# Patient Record
Sex: FEMALE | NOT HISPANIC OR LATINO | Employment: FULL TIME | URBAN - METROPOLITAN AREA
[De-identification: names, ages, dates, MRNs, and addresses within clinical notes are randomized per-mention and may not be internally consistent; named-entity substitution may affect disease eponyms.]

---

## 2022-10-21 ENCOUNTER — OCCUPATIONAL MEDICINE (OUTPATIENT)
Dept: URGENT CARE | Facility: CLINIC | Age: 42
End: 2022-10-21
Payer: COMMERCIAL

## 2022-10-21 VITALS
BODY MASS INDEX: 30.27 KG/M2 | TEMPERATURE: 98.1 F | OXYGEN SATURATION: 98 % | DIASTOLIC BLOOD PRESSURE: 68 MMHG | RESPIRATION RATE: 18 BRPM | SYSTOLIC BLOOD PRESSURE: 106 MMHG | HEIGHT: 62 IN | HEART RATE: 70 BPM | WEIGHT: 164.5 LBS

## 2022-10-21 DIAGNOSIS — M54.50 ACUTE LEFT-SIDED LOW BACK PAIN WITHOUT SCIATICA: ICD-10-CM

## 2022-10-21 DIAGNOSIS — Z02.1 PRE-EMPLOYMENT DRUG SCREENING: ICD-10-CM

## 2022-10-21 LAB
AMP AMPHETAMINE: NORMAL
BREATH ALCOHOL COMMENT: NORMAL
COC COCAINE: NORMAL
INT CON NEG: NORMAL
INT CON POS: NORMAL
MET METHAMPHETAMINES: NORMAL
OPI OPIATES: NORMAL
PCP PHENCYCLIDINE: NORMAL
POC BREATHALIZER: 0 PERCENT (ref 0–0.01)
POC DRUG COMMENT 753798-OCCUPATIONAL HEALTH: NEGATIVE
THC: NORMAL

## 2022-10-21 PROCEDURE — 82075 ASSAY OF BREATH ETHANOL: CPT | Performed by: NURSE PRACTITIONER

## 2022-10-21 PROCEDURE — 80305 DRUG TEST PRSMV DIR OPT OBS: CPT | Performed by: NURSE PRACTITIONER

## 2022-10-21 PROCEDURE — 99203 OFFICE O/P NEW LOW 30 MIN: CPT | Performed by: NURSE PRACTITIONER

## 2022-10-21 NOTE — LETTER
"EMPLOYEE’S CLAIM FOR COMPENSATION/ REPORT OF INITIAL TREATMENT  FORM C-4    EMPLOYEE’S CLAIM - PROVIDE ALL INFORMATION REQUESTED   First Name  Mine Last Name  Rafy Birthdate                    1980                Sex  female Claim Number (Insurer’s Use Only)    Home Address  [address] Age  42 y.o. 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 (if applicable)  Cleaning the room    How did this injury or occupational disease occur? (Be specific an answer in detail. Use additional sheet if necessary)  I working my back is pain   If you believe that you have an occupational disease, when did you first have knowledge of the disability and it relationship to your employment?  n/a Witnesses to the Accident  no      Nature of Injury or Occupational Disease  Strain  Part(s) of Body Injured or Affected  Lower Back Area (Lumbar Area & Lumbo-Sacral), ,     I certify that the above is true and correct to the best of my knowledge and that I have provided this information in order to obtain the benefits of [place]’s " [place]).  I hereby authorize any physician, chiropractor, surgeon, practitioner, or other person, any hospital, including [facility] or [facility], any medical service organization, any insurance company, or other institution or organization to release to each other, any medical or other information, including benefits paid or payable, pertinent to this injury or disease, except information relative to diagnosis, treatment and/or counseling for AIDS, psychological conditions, alcohol or controlled substances, for which I must give specific authorization.  A Photostat of this authorization shall be as valid as the original.     Date   Place Employee’s Original or  *Electronic Signature   THIS REPORT MUST BE COMPLETED AND MAILED WITHIN 3 WORKING DAYS OF TREATMENT   Place  [facility]  Name of [facility]   Date  10/21/2022 Diagnosis and Description of Injury or Occupational Disease  (M54.50) Acute left-sided low back pain without sciatica Is there evidence the injured employee was under the influence of alcohol and/or another controlled substance at the time of accident?  ? No ? Yes (if yes, please explain)    Hour  3:58 PM   The encounter diagnosis was Acute left-sided low back pain without sciatica. No   Treatment  Rest, Ice and ibuprofen. No lifting pushing or pulling  Have you advised the patient to remain off work five days or     more?    X-Ray Findings      ? Yes Indicate dates:   From   To      From information given by the employee, together with medical evidence, can        you directly connect this injury or occupational disease as job incurred?  Yes ? No If no, is the injured employee capable of:  ? full duty  No ? modified duty  Yes   Is additional medical care by a physician indicated?  Yes If Modified Duty, Specify any Limitations / Restrictions  No lifting pushing  "pulling. No standing still for more than two hours at once. NO sitting for more than 2 hours at once.    Do you know of any previous injury or disease contributing to this condition or occupational disease?  ? Yes ? No (Explain if yes)                          No   Date  10/21/2022 Print Health Care Provider's   MARIALUISA Ceja I certify the employer’s copy of  this form was mailed on:   Address  [address] [place] [address] [address] Insurer’s Use Only     [facility] Zip  [address]    Provider’s Tax ID Number  [id number] Telephone  Dept: [phone]             Health Care Provider’s Original or Electronic Signature  e-SignNATALIE MILSL Degree (MD,DO, DC,PACiaraC,APRN)   APRN      * Complete and attach Release of Information (Form C-4A) when injured employee signs C-4 Form electronically  ORIGINAL - TREATING HEALTHCARE PROVIDER PAGE 2 - INSURER/TPA PAGE 3 - EMPLOYER PAGE 4 - EMPLOYEE             Form C-4 (rev.08/21)           BRIEF DESCRIPTION OF RIGHTS AND BENEFITS  (Pursuant to [place] 616C.050)    Notice of Injury or Occupational Disease (Incident Report Form C-1): If an injury or occupational disease (OD) arises out of and in the course of employment, you must provide written notice to your employer as soon as practicable, but no later than 7 days after the accident or OD. Your employer shall maintain a sufficient supply of the required forms.    Claim for Compensation (Form C-4): If medical treatment is sought, the form C-4 is available at the place of initial treatment. A completed \"Claim for Compensation\" (Form C-4) must be filed within 90 days after an accident or OD. The treating physician or chiropractor must, within 3 working days after treatment, complete and mail to the employer, the employer's insurer and third-party , the Claim for Compensation.    Medical Treatment: If you require medical treatment for your on-the-job injury or OD, you may be required to select a physician or " chiropractor from a list provided by your workers’ compensation insurer, if it has contracted with an Organization for Managed Care (MCO) or Preferred Provider Organization (PPO) or providers of health care. If your employer has not entered into a contract with an MCO or PPO, you may select a physician or chiropractor from the Panel of Physicians and Chiropractors. Any medical costs related to your industrial injury or OD will be paid by your insurer.    Temporary Total Disability (TTD): If your doctor has certified that you are unable to work for a period of at least 5 consecutive days, or 5 cumulative days in a 20-day period, or places restrictions on you that your employer does not accommodate, you may be entitled to TTD compensation.    Temporary Partial Disability (TPD): If the wage you receive upon reemployment is less than the compensation for TTD to which you are entitled, the insurer may be required to pay you TPD compensation to make up the difference. TPD can only be paid for a maximum of 24 months.    Permanent Partial Disability (PPD): When your medical condition is stable and there is an indication of a PPD as a result of your injury or OD, within 30 days, your insurer must arrange for an evaluation by a rating physician or chiropractor to determine the degree of your PPD. The amount of your PPD award depends on the date of injury, the results of the PPD evaluation, your age and wage.    Permanent Total Disability (PTD): If you are medically certified by a treating physician or chiropractor as permanently and totally disabled and have been granted a PTD status by your insurer, you are entitled to receive monthly benefits not to exceed 66 2/3% of your average monthly wage. The amount of your PTD payments is subject to reduction if you previously received a lump-sum PPD award.    Vocational Rehabilitation Services: You may be eligible for vocational rehabilitation services if you are unable to return to  the job due to a permanent physical impairment or permanent restrictions as a result of your injury or occupational disease.    Transportation and Per Lisa Reimbursement: You may be eligible for travel expenses and per lisa associated with medical treatment.    Reopening: You may be able to reopen your claim if your condition worsens after claim closure.     Appeal Process: If you disagree with a written determination issued by the insurer or the insurer does not respond to your request, you may appeal to the Department of Administration, , by following the instructions contained in your determination letter. You must appeal the determination within 70 days from the date of the determination letter at [address], [address], [place], [place] [address], or [address] [facility] [address], [place], [place] [address]. If you disagree with the  decision, you may appeal to the Department of Administration, . You must file your appeal within 30 days from the date of the  decision letter at [address], [address], [place], [place] [address], or [address] [facility] [facility] [address], [place] [address]. If you disagree with a decision of an , you may file a petition for judicial review with the District Court. You must do so within 30 days of the Appeal Officer’s decision. You may be represented by an  at your own expense or you may contact the [facility] for possible representation.    [place]  for Injured Workers (NAIW): If you disagree with a  decision, you may request that NAIW represent you without charge at an  Hearing. For information regarding denial of benefits, you may contact the [facility] at: [address], [address], [place] [address], [phone], or [address] [facility] [address], [place] [address], [phone]    To File a Complaint with the Division: If you wish  to file a complaint with the  of the Division of Industrial Relations (DIR),  please contact the Workers’ Compensation Section, [address], [address], [place], [place] [address], telephone (413) [phone], or [address], [address], [place], [place] [address], telephone [phone].    For assistance with Workers’ Compensation Issues: You may contact the [facility] Office for Consumer Health Assistance, [address], [address], [place], [place] [address], Toll Free 1-279.257.3724, Web site: http://[facility].nv.gov/Programs/SCOTT E-mail: scott@[facility].nv.gov              __________________________________________________________________                                    _________________            Employee Name / Signature                                                                                                                            Date                                                                                                                                                                                                                              D-2 (rev. 10/20)

## 2022-10-21 NOTE — LETTER
[address] [address]  Phone:  [phone] - Fax:  [phone]   [facility] Progress Report and Disability Certification  Date of Service: 10/21/2022   No Show:  No  Date / Time of Next Visit: 10/27/2022 @ 10:00am   Claim Information   Patient Name: Katlyn Hillman  Claim Number:     Employer: [employer] Date of Injury: 10/16/2022     Insurer / TPA: [employer]  ID / SSN:     Occupation: cleaning  Diagnosis: The encounter diagnosis was Acute left-sided low back pain without sciatica.    Medical Information   Related to Industrial Injury? Yes    Subjective Complaints:  DOI: 10/16/22 ADRIEN: Patient states that she was lifting a box while cleaning a room and felt a pull in her left lower back.  Patient states the pain is improving although she has not 100% at this point.  The pain does worsen with sitting or standing for too long.  Patient has not tried any alleviating measures at this point although she has been doing lighter duty work.  Patient denies second job or previous injury.    Patient denies nausea vomiting diarrhea.  Denies burning with urination flank pain or abdominal pain. Denies cough, shortness of breath or lower leg swelling. Patient denies direct trauma, bilateral or progressive neurological deficits as discussed, new urinary retention,  fecal incontinence or saddle anesthesia, no history of cancer , IV drug use, fevers, recent surgical procedures or the inability to walk. Patient denies ripping sensation in back or history of AAA, coldness or color change in extremity.    Objective Findings: Physical Exam  Constitutional:       Appearance: Normal appearance.   Musculoskeletal:      Cervical back: Normal.      Thoracic back: Normal.      Lumbar back: Spasms and tenderness present. No swelling, edema, deformity, signs of trauma or bony tenderness (No midline bony tenderness crepitus or step-off noted.). Normal  range of motion. Positive left straight leg raise test. Negative right straight leg raise test. No scoliosis.        Back:     Neurological:      Mental Status: She is alert.      Sensory: Sensation is intact.      Gait: Gait is intact.      Deep Tendon Reflexes:      Reflex Scores:       Bicep reflexes are 2+ on the right side and 2+ on the left side.     Pre-Existing Condition(s):     Assessment:   Initial Visit    Status: Additional Care Required  Permanent Disability:No    Plan:   Comments:Rest, ice ibuprofen    Diagnostics: X-ray  Comments:X-ray Lumbar Spine 4 View    Comments:  Patient was seen at [facility] on 10/16/2022 for acute low back pain related to injury.    Disability Information   Status: Released to Restricted Duty    From:  10/21/2022  Through: 10/27/2022 Restrictions are: Temporary   Physical Restrictions   Sitting:    Standing:    Stoopin hrs/day Bendin hrs/day   Squattin hrs/day Walking:    Climbin hrs/day Pushin hrs/day   Pullin hrs/day Other:    Reaching Above Shoulder (L):   Reaching Above Shoulder (R):       Reaching Below Shoulder (L):    Reaching Below Shoulder (R):      Not to exceed Weight Limits   Carrying(hrs):   Weight Limit(lb):   Lifting(hrs):   Weight  Limit(lb):     Comments: No lifting pushing pulling. No standing still for more than two hours at once. NO sitting for more than 2 hours at once.     Repetitive Actions   Hands: i.e. Fine Manipulations from Grasping:     Feet: i.e. Operating Foot Controls:     Driving / Operate Machinery:     Health Care Provider’s Original or Electronic Signature  CHARLOTTE CejaRSantyN. Health Care Provider’s Original or Electronic Signature    Kenneth Roger MD         Clinic Name / Location: [place] [address]  [place] [address] Clinic Phone Number: Dept: [phone]   Appointment Time: 2:30 Pm Visit Start Time: 3:58 PM   Check-In Time:  3:02 Pm Visit Discharge Time:      Original-Treating Physician or Chiropractor    Page 2-Insurer/TPA    Page 3-Employer    Page 4-Employee

## 2022-10-27 ENCOUNTER — OCCUPATIONAL MEDICINE (OUTPATIENT)
Dept: URGENT CARE | Facility: CLINIC | Age: 42
End: 2022-10-27
Payer: COMMERCIAL

## 2022-10-27 VITALS
RESPIRATION RATE: 16 BRPM | WEIGHT: 164.8 LBS | TEMPERATURE: 97.9 F | HEIGHT: 62 IN | SYSTOLIC BLOOD PRESSURE: 104 MMHG | DIASTOLIC BLOOD PRESSURE: 66 MMHG | OXYGEN SATURATION: 100 % | BODY MASS INDEX: 30.33 KG/M2 | HEART RATE: 86 BPM

## 2022-10-27 DIAGNOSIS — M54.50 ACUTE LEFT-SIDED LOW BACK PAIN WITHOUT SCIATICA: ICD-10-CM
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
## 2022-10-27 PROCEDURE — 99213 OFFICE O/P EST LOW 20 MIN: CPT | Performed by: STUDENT IN AN ORGANIZED HEALTH CARE EDUCATION/TRAINING PROGRAM

## 2022-10-27 NOTE — LETTER
[place] [address] [facility] [address] [address]  Phone:  [phone] - Fax:  [phone]   [facility] Progress Report and Disability Certification  Date of Service: 10/27/2022   No Show:  No  Date / Time of Next Visit: 11/1/2022   Claim Information   Patient Name: Katlyn Hillman  Claim Number:     Employer: [employer]  Date of Injury: 10/16/2022     Insurer / TPA: [employer]  ID / SSN:     Occupation: cleaning  Diagnosis: Diagnoses of Acute left-sided low back pain without sciatica and Work related injury were pertinent to this visit.    Medical Information   Related to Industrial Injury? Yes    Subjective Complaints:  DOI: 10/16/22 ADRIEN: Patient states that she was lifting a box while cleaning a room and felt a pull in her left lower back.  Patient states the pain is improving although she has not 100% at this point.  The pain does worsen with sitting or standing for too long.  Patient has not tried any alleviating measures at this point although she has been doing lighter duty work.  Patient denies second job or previous injury.     Patient denies nausea vomiting diarrhea.  Denies burning with urination flank pain or abdominal pain. Denies cough, shortness of breath or lower leg swelling. Patient denies direct trauma, bilateral or progressive neurological deficits as discussed, new urinary retention,  fecal incontinence or saddle anesthesia, no history of cancer , IV drug use, fevers, recent surgical procedures or the inability to walk. Patient denies ripping sensation in back or history of AAA, coldness or color change in extremity.     Follow-up visit 10/27/2022: Patient presents for reevaluation of work-related injury to her left lower back.  Patient was seen at her last visit and diagnosed with acute muscle strain of the left lower back without sciatica.  She does report that her pain has greatly improved and she is almost back to 100%.  She  states that she still has a little bit discomfort with certain movements such as picking things up from the ground but this does not always happen.  Not taking any medications for symptoms at this time.  She has been using heat with improvement in her symptoms.  She did return to work with her work restrictions and states no issues with her work activities.  She denies numbness, tingling, burning, radiation of pain into her lower extremities, saddle anesthesia, loss of bladder control, loss of bowel control, headache, dizziness, chest pain, shortness of breath, weakness, or reduced range of motion.  She denies second appointment or worsening of her symptoms.   Objective Findings: Lumbar back: Mild tenderness to palpation over the left lumbar paraspinal muscles.  No midline spinal tenderness, crepitus, or step-offs.  No erythema, ecchymosis, induration, fluctuance, swelling, or signs of trauma.  Full active and passive range of motion at the hips.  5 out of 5 strength during knee flexion and knee extension.  5 out of 5 strength during hip flexion and hip extension.  2+ patellar reflexes bilaterally.  Sensation intact.   Pre-Existing Condition(s):     Assessment:   Condition Improved    Status: Additional Care Required  Permanent Disability:No    Plan:      Diagnostics:      Comments:  Patient will do a 5-day trial of full work duty.  Patient is agreeable this.  Patient will follow-up in 5 days for reevaluation to see if any work duties for manageable or if additional work restrictions need to be put in place.    Disability Information   Status: Released to Full Duty    From:  10/27/2022  Through: 11/1/2022 Restrictions are: Temporary   Physical Restrictions   Sitting:    Standing:    Stooping:    Bending:      Squatting:    Walking:    Climbing:    Pushing:      Pulling:    Other:    Reaching Above Shoulder (L):   Reaching Above Shoulder (R):       Reaching Below Shoulder (L):    Reaching Below Shoulder (R):      Not  to exceed Weight Limits   Carrying(hrs):   Weight Limit(lb):   Lifting(hrs):   Weight  Limit(lb):     Comments: Patient's presentation physical exam findings are consistent with muscle strain to the left lower lumbar back without sciatica.  Her symptoms have greatly improved and she states that she previous feels like she is back to normal other than having a small amount of pain intermittently with certain and bending motions.  Discussed with the patient and we will do a 5-day trial of full work duty which she is agreeable to.  She will follow-up in clinic in 5 days to see if she is able to perform her normal work duties without issue or if additional work restrictions need to be put in place.  She may use ibuprofen/Tylenol as needed.  May use heat packs to the area 3-5 times per day for 20 minutes at a time.  ED/return precautions were given.    Repetitive Actions   Hands: i.e. Fine Manipulations from Grasping:     Feet: i.e. Operating Foot Controls:     Driving / Operate Machinery:     Health Care Provider’s Original or Electronic Signature  Mook James P.A.-C. Health Care Provider’s Original or Electronic Signature    Kenneth Roger MD         Clinic Name / Location: [address] [place] [address] Clinic Phone Number: Dept: [phone]   Appointment Time: 10:00 Am Visit Start Time: 10:55 AM   Check-In Time:  9:59 Am Visit Discharge Time:  11:40 AM    Original-Treating Physician or Chiropractor    Page 2-Insurer/TPA    Page 3-Employer    Page 4-Employee

## 2022-10-27 NOTE — PROGRESS NOTES
Subjective:     Katlyn Hillman is a 42 y.o. 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  Patient states the pain is improving although she has not 100% at this point.  The pain does worsen with sitting or standing for too long.  Patient has not tried any alleviating measures at this point although she has been doing lighter duty work.  Patient denies second job or previous injury.     Patient denies nausea vomiting diarrhea.  Denies burning with urination flank pain or abdominal pain. Denies cough, shortness of breath or lower leg swelling. Patient denies direct trauma, bilateral or progressive neurological deficits as discussed, new urinary retention,  fecal incontinence or saddle anesthesia, no history of cancer , IV drug use, fevers, recent surgical procedures or the inability to walk. Patient denies ripping sensation in back or history of AAA, coldness or color change in extremity.     Follow-up visit 10/27/2022: Patient presents for reevaluation of work-related injury to her left lower back.  Patient was seen at her last visit and diagnosed with acute muscle strain of the left lower back without sciatica.  She does report that her pain has greatly improved and she is almost back to 100%.  She states that she still has a little bit discomfort with certain movements such as picking things up from the ground but this does not always happen.  Not taking any medications for symptoms at this time.  She has been using heat with improvement in her symptoms.  She did return to work with her work restrictions and states no issues with her work activities.  She denies numbness, tingling, burning, radiation of pain into her lower extremities, saddle anesthesia, loss of bladder control, loss of bowel control, headache, dizziness, chest pain, shortness of breath, weakness, or reduced  "range of motion.  She denies second appointment or worsening of her symptoms.    PMH:   No pertinent past medical history to this problem  MEDS:  Medications were reviewed in EMR  ALLERGIES:  Allergies were reviewed in EMR  FH:   No pertinent family history to this problem       Objective:     /66   Pulse 86   Temp 36.6 °C (97.9 °F) (Temporal)   Resp 16   Ht 1.575 m (5' 2\")   Wt 74.8 kg (164 lb 12.8 oz)   SpO2 100%   BMI 30.14 kg/m²     Lumbar back: Mild tenderness to palpation over the left lumbar paraspinal muscles.  No midline spinal tenderness, crepitus, or step-offs.  No erythema, ecchymosis, induration, fluctuance, swelling, or signs of trauma.  Full active and passive range of motion at the hips.  5 out of 5 strength during knee flexion and knee extension.  5 out of 5 strength during hip flexion and hip extension.  2+ patellar reflexes bilaterally.  Sensation intact.    Assessment/Plan:       1. Acute left-sided low back pain without sciatica    2. Work related injury    Released to Full Duty FROM 10/27/2022 TO 11/1/2022  Patient's presentation physical exam findings are consistent with muscle strain to the left lower lumbar back without sciatica.  Her symptoms have greatly improved and she states that she previous feels like she is back to normal other than having a small amount of pain intermittently with certain and bending motions.  Discussed with the patient and we will do a 5-day trial of full work duty which she is agreeable to.  She will follow-up in clinic in 5 days to see if she is able to perform her normal work duties without issue or if additional work restrictions need to be put in place.  She may use ibuprofen/Tylenol as needed.  May use heat packs to the area 3-5 times per day for 20 minutes at a time.  ED/return precautions were given.  Patient will do a 5-day trial of full work duty.  Patient is agreeable this.  Patient will follow-up in 5 days for reevaluation to see if any " work duties for manageable or if additional work restrictions need to be put in place.    Differential diagnosis, natural history, supportive care, and indications for immediate follow-up discussed.

## 2025-02-28 ENCOUNTER — HOSPITAL ENCOUNTER (INPATIENT)
Facility: MEDICAL CENTER | Age: 45
LOS: 2 days | DRG: 760 | End: 2025-03-02
Attending: EMERGENCY MEDICINE | Admitting: INTERNAL MEDICINE
Payer: MEDICAID

## 2025-02-28 ENCOUNTER — APPOINTMENT (OUTPATIENT)
Dept: RADIOLOGY | Facility: MEDICAL CENTER | Age: 45
DRG: 760 | End: 2025-02-28
Attending: EMERGENCY MEDICINE
Payer: MEDICAID

## 2025-02-28 DIAGNOSIS — D64.9 ANEMIA, UNSPECIFIED TYPE: ICD-10-CM

## 2025-02-28 DIAGNOSIS — J10.1 INFLUENZA A: ICD-10-CM

## 2025-02-28 DIAGNOSIS — R52 BODY ACHES: Primary | ICD-10-CM

## 2025-02-28 DIAGNOSIS — R50.9 ACUTE FEBRILE ILLNESS: ICD-10-CM

## 2025-02-28 PROBLEM — E87.1 HYPONATREMIA: Status: ACTIVE | Noted: 2025-02-28

## 2025-02-28 PROBLEM — E87.6 HYPOKALEMIA: Status: ACTIVE | Noted: 2025-02-28

## 2025-02-28 PROBLEM — E87.20 NORMAL ANION GAP METABOLIC ACIDOSIS: Status: ACTIVE | Noted: 2025-02-28

## 2025-02-28 LAB
ABO GROUP BLD: NORMAL
ALBUMIN SERPL BCP-MCNC: 3.9 G/DL (ref 3.2–4.9)
ALBUMIN/GLOB SERPL: 1 G/DL
ALP SERPL-CCNC: 117 U/L (ref 30–99)
ALT SERPL-CCNC: 16 U/L (ref 2–50)
ANION GAP SERPL CALC-SCNC: 14 MMOL/L (ref 7–16)
ANISOCYTOSIS BLD QL SMEAR: ABNORMAL
APTT PPP: 38.7 SEC (ref 24.7–36)
AST SERPL-CCNC: 38 U/L (ref 12–45)
BARCODED ABORH UBTYP: 5100
BARCODED PRD CODE UBPRD: NORMAL
BARCODED UNIT NUM UBUNT: NORMAL
BASOPHILS # BLD AUTO: 0.3 % (ref 0–1.8)
BASOPHILS # BLD: 0.01 K/UL (ref 0–0.12)
BILIRUB SERPL-MCNC: 1.1 MG/DL (ref 0.1–1.5)
BLD GP AB SCN SERPL QL: NORMAL
BUN SERPL-MCNC: 11 MG/DL (ref 8–22)
CALCIUM ALBUM COR SERPL-MCNC: 8.5 MG/DL (ref 8.5–10.5)
CALCIUM SERPL-MCNC: 8.4 MG/DL (ref 8.5–10.5)
CHLORIDE SERPL-SCNC: 101 MMOL/L (ref 96–112)
CO2 SERPL-SCNC: 16 MMOL/L (ref 20–33)
COMMENT 1642: NORMAL
COMPONENT R 8504R: NORMAL
CREAT SERPL-MCNC: 1.05 MG/DL (ref 0.5–1.4)
EOSINOPHIL # BLD AUTO: 0 K/UL (ref 0–0.51)
EOSINOPHIL NFR BLD: 0 % (ref 0–6.9)
ERYTHROCYTE [DISTWIDTH] IN BLOOD BY AUTOMATED COUNT: 42.3 FL (ref 35.9–50)
FERRITIN SERPL-MCNC: 19.5 NG/ML (ref 10–291)
FLUAV RNA SPEC QL NAA+PROBE: POSITIVE
FLUBV RNA SPEC QL NAA+PROBE: NEGATIVE
FOLATE SERPL-MCNC: 8.2 NG/ML
GFR SERPLBLD CREATININE-BSD FMLA CKD-EPI: 68 ML/MIN/1.73 M 2
GLOBULIN SER CALC-MCNC: 4 G/DL (ref 1.9–3.5)
GLUCOSE SERPL-MCNC: 118 MG/DL (ref 65–99)
HCG SERPL QL: NEGATIVE
HCT VFR BLD AUTO: 26.1 % (ref 37–47)
HGB BLD-MCNC: 6.9 G/DL (ref 12–16)
HGB RETIC QN AUTO: 17.1 PG/CELL (ref 29–35)
HYPOCHROMIA BLD QL SMEAR: ABNORMAL
IMM GRANULOCYTES # BLD AUTO: 0.03 K/UL (ref 0–0.11)
IMM GRANULOCYTES NFR BLD AUTO: 0.8 % (ref 0–0.9)
IMM RETICS NFR: 23.7 % (ref 2.6–16.1)
INR PPP: 1.29 (ref 0.87–1.13)
IRON SATN MFR SERPL: 5 % (ref 15–55)
IRON SERPL-MCNC: 23 UG/DL (ref 40–170)
LACTATE SERPL-SCNC: 2.1 MMOL/L (ref 0.5–2)
LYMPHOCYTES # BLD AUTO: 0.32 K/UL (ref 1–4.8)
LYMPHOCYTES NFR BLD: 8.1 % (ref 22–41)
MCH RBC QN AUTO: 15.9 PG (ref 27–33)
MCHC RBC AUTO-ENTMCNC: 26.4 G/DL (ref 32.2–35.5)
MCV RBC AUTO: 60 FL (ref 81.4–97.8)
MICROCYTES BLD QL SMEAR: ABNORMAL
MONOCYTES # BLD AUTO: 0.41 K/UL (ref 0–0.85)
MONOCYTES NFR BLD AUTO: 10.4 % (ref 0–13.4)
MORPHOLOGY BLD-IMP: NORMAL
NEUTROPHILS # BLD AUTO: 3.19 K/UL (ref 1.82–7.42)
NEUTROPHILS NFR BLD: 80.4 % (ref 44–72)
NRBC # BLD AUTO: 0 K/UL
NRBC BLD-RTO: 0 /100 WBC (ref 0–0.2)
PLATELET # BLD AUTO: 168 K/UL (ref 164–446)
PLATELET BLD QL SMEAR: NORMAL
POIKILOCYTOSIS BLD QL SMEAR: NORMAL
POTASSIUM SERPL-SCNC: 2.5 MMOL/L (ref 3.6–5.5)
PRODUCT TYPE UPROD: NORMAL
PROT SERPL-MCNC: 7.9 G/DL (ref 6–8.2)
PROTHROMBIN TIME: 16.1 SEC (ref 12–14.6)
RBC # BLD AUTO: 4.35 M/UL (ref 4.2–5.4)
RBC BLD AUTO: PRESENT
RETICS # AUTO: 0.05 M/UL (ref 0.04–0.12)
RETICS/RBC NFR: 1.2 % (ref 0.8–2.6)
RH BLD: NORMAL
RSV RNA SPEC QL NAA+PROBE: NEGATIVE
SARS-COV-2 RNA RESP QL NAA+PROBE: NOTDETECTED
SODIUM SERPL-SCNC: 131 MMOL/L (ref 135–145)
TIBC SERPL-MCNC: 439 UG/DL (ref 250–450)
UIBC SERPL-MCNC: 416 UG/DL (ref 110–370)
UNIT STATUS USTAT: NORMAL
VIT B12 SERPL-MCNC: 385 PG/ML (ref 211–911)
WBC # BLD AUTO: 4 K/UL (ref 4.8–10.8)

## 2025-02-28 PROCEDURE — 82010 KETONE BODYS QUAN: CPT

## 2025-02-28 PROCEDURE — 85730 THROMBOPLASTIN TIME PARTIAL: CPT

## 2025-02-28 PROCEDURE — A9270 NON-COVERED ITEM OR SERVICE: HCPCS | Performed by: INTERNAL MEDICINE

## 2025-02-28 PROCEDURE — 85025 COMPLETE CBC W/AUTO DIFF WBC: CPT

## 2025-02-28 PROCEDURE — 700102 HCHG RX REV CODE 250 W/ 637 OVERRIDE(OP): Performed by: INTERNAL MEDICINE

## 2025-02-28 PROCEDURE — 83605 ASSAY OF LACTIC ACID: CPT

## 2025-02-28 PROCEDURE — A9270 NON-COVERED ITEM OR SERVICE: HCPCS | Mod: UD

## 2025-02-28 PROCEDURE — 770020 HCHG ROOM/CARE - TELE (206)

## 2025-02-28 PROCEDURE — 83550 IRON BINDING TEST: CPT

## 2025-02-28 PROCEDURE — 86900 BLOOD TYPING SEROLOGIC ABO: CPT

## 2025-02-28 PROCEDURE — 82746 ASSAY OF FOLIC ACID SERUM: CPT

## 2025-02-28 PROCEDURE — 36415 COLL VENOUS BLD VENIPUNCTURE: CPT

## 2025-02-28 PROCEDURE — A9270 NON-COVERED ITEM OR SERVICE: HCPCS | Mod: UD | Performed by: EMERGENCY MEDICINE

## 2025-02-28 PROCEDURE — 700101 HCHG RX REV CODE 250: Performed by: INTERNAL MEDICINE

## 2025-02-28 PROCEDURE — 86923 COMPATIBILITY TEST ELECTRIC: CPT

## 2025-02-28 PROCEDURE — 86850 RBC ANTIBODY SCREEN: CPT

## 2025-02-28 PROCEDURE — 85046 RETICYTE/HGB CONCENTRATE: CPT

## 2025-02-28 PROCEDURE — 99223 1ST HOSP IP/OBS HIGH 75: CPT | Performed by: INTERNAL MEDICINE

## 2025-02-28 PROCEDURE — 8E0ZXY6 ISOLATION: ICD-10-PCS | Performed by: INTERNAL MEDICINE

## 2025-02-28 PROCEDURE — 0241U HCHG SARS-COV-2 COVID-19 NFCT DS RESP RNA 4 TRGT ED POC: CPT

## 2025-02-28 PROCEDURE — 700105 HCHG RX REV CODE 258: Performed by: INTERNAL MEDICINE

## 2025-02-28 PROCEDURE — 700102 HCHG RX REV CODE 250 W/ 637 OVERRIDE(OP): Mod: UD

## 2025-02-28 PROCEDURE — 700102 HCHG RX REV CODE 250 W/ 637 OVERRIDE(OP): Mod: UD | Performed by: EMERGENCY MEDICINE

## 2025-02-28 PROCEDURE — 86901 BLOOD TYPING SEROLOGIC RH(D): CPT

## 2025-02-28 PROCEDURE — 85610 PROTHROMBIN TIME: CPT

## 2025-02-28 PROCEDURE — 82607 VITAMIN B-12: CPT

## 2025-02-28 PROCEDURE — 99285 EMERGENCY DEPT VISIT HI MDM: CPT

## 2025-02-28 PROCEDURE — 36430 TRANSFUSION BLD/BLD COMPNT: CPT

## 2025-02-28 PROCEDURE — P9016 RBC LEUKOCYTES REDUCED: HCPCS

## 2025-02-28 PROCEDURE — 82728 ASSAY OF FERRITIN: CPT

## 2025-02-28 PROCEDURE — 96375 TX/PRO/DX INJ NEW DRUG ADDON: CPT

## 2025-02-28 PROCEDURE — 83540 ASSAY OF IRON: CPT

## 2025-02-28 PROCEDURE — 700111 HCHG RX REV CODE 636 W/ 250 OVERRIDE (IP): Performed by: EMERGENCY MEDICINE

## 2025-02-28 PROCEDURE — 96365 THER/PROPH/DIAG IV INF INIT: CPT

## 2025-02-28 PROCEDURE — 80053 COMPREHEN METABOLIC PANEL: CPT

## 2025-02-28 PROCEDURE — 84703 CHORIONIC GONADOTROPIN ASSAY: CPT

## 2025-02-28 PROCEDURE — 700105 HCHG RX REV CODE 258: Mod: UD | Performed by: EMERGENCY MEDICINE

## 2025-02-28 RX ORDER — SODIUM CHLORIDE AND POTASSIUM CHLORIDE 300; 900 MG/100ML; MG/100ML
INJECTION, SOLUTION INTRAVENOUS CONTINUOUS
Status: DISCONTINUED | OUTPATIENT
Start: 2025-02-28 | End: 2025-03-01

## 2025-02-28 RX ORDER — OSELTAMIVIR PHOSPHATE 75 MG/1
75 CAPSULE ORAL 2 TIMES DAILY
Qty: 10 CAPSULE | Refills: 0 | Status: ACTIVE | OUTPATIENT
Start: 2025-02-28 | End: 2025-03-02

## 2025-02-28 RX ORDER — OSELTAMIVIR PHOSPHATE 75 MG/1
75 CAPSULE ORAL 2 TIMES DAILY
Status: DISCONTINUED | OUTPATIENT
Start: 2025-02-28 | End: 2025-03-02 | Stop reason: HOSPADM

## 2025-02-28 RX ORDER — ACETAMINOPHEN 500 MG
1000 TABLET ORAL ONCE
Status: DISCONTINUED | OUTPATIENT
Start: 2025-02-28 | End: 2025-02-28

## 2025-02-28 RX ORDER — IBUPROFEN 600 MG/1
600 TABLET, FILM COATED ORAL EVERY 6 HOURS PRN
Qty: 21 TABLET | Refills: 0 | Status: SHIPPED | OUTPATIENT
Start: 2025-02-28 | End: 2025-03-07

## 2025-02-28 RX ORDER — OXYCODONE HYDROCHLORIDE 5 MG/1
5 TABLET ORAL
Refills: 0 | Status: DISCONTINUED | OUTPATIENT
Start: 2025-02-28 | End: 2025-03-02 | Stop reason: HOSPADM

## 2025-02-28 RX ORDER — POTASSIUM CHLORIDE 1500 MG/1
40 TABLET, EXTENDED RELEASE ORAL
Status: COMPLETED | OUTPATIENT
Start: 2025-02-28 | End: 2025-03-01

## 2025-02-28 RX ORDER — MORPHINE SULFATE 4 MG/ML
2 INJECTION INTRAVENOUS
Status: DISCONTINUED | OUTPATIENT
Start: 2025-02-28 | End: 2025-03-02 | Stop reason: HOSPADM

## 2025-02-28 RX ORDER — ACETAMINOPHEN 325 MG/1
650 TABLET ORAL ONCE
Status: COMPLETED | OUTPATIENT
Start: 2025-02-28 | End: 2025-02-28

## 2025-02-28 RX ORDER — SODIUM CHLORIDE 9 MG/ML
1000 INJECTION, SOLUTION INTRAVENOUS ONCE
Status: COMPLETED | OUTPATIENT
Start: 2025-02-28 | End: 2025-02-28

## 2025-02-28 RX ORDER — AMOXICILLIN 250 MG
2 CAPSULE ORAL EVERY EVENING
Status: DISCONTINUED | OUTPATIENT
Start: 2025-02-28 | End: 2025-03-02 | Stop reason: HOSPADM

## 2025-02-28 RX ORDER — PROMETHAZINE HYDROCHLORIDE 25 MG/1
12.5-25 TABLET ORAL EVERY 4 HOURS PRN
Status: DISCONTINUED | OUTPATIENT
Start: 2025-02-28 | End: 2025-03-02 | Stop reason: HOSPADM

## 2025-02-28 RX ORDER — ONDANSETRON 2 MG/ML
4 INJECTION INTRAMUSCULAR; INTRAVENOUS EVERY 4 HOURS PRN
Status: DISCONTINUED | OUTPATIENT
Start: 2025-02-28 | End: 2025-03-02 | Stop reason: HOSPADM

## 2025-02-28 RX ORDER — KETOROLAC TROMETHAMINE 15 MG/ML
15 INJECTION, SOLUTION INTRAMUSCULAR; INTRAVENOUS ONCE
Status: COMPLETED | OUTPATIENT
Start: 2025-02-28 | End: 2025-02-28

## 2025-02-28 RX ORDER — ACETAMINOPHEN 325 MG/1
650 TABLET ORAL EVERY 6 HOURS PRN
Status: DISCONTINUED | OUTPATIENT
Start: 2025-02-28 | End: 2025-03-02 | Stop reason: HOSPADM

## 2025-02-28 RX ORDER — POLYETHYLENE GLYCOL 3350 17 G/17G
1 POWDER, FOR SOLUTION ORAL
Status: DISCONTINUED | OUTPATIENT
Start: 2025-02-28 | End: 2025-03-02 | Stop reason: HOSPADM

## 2025-02-28 RX ORDER — OXYCODONE HYDROCHLORIDE 5 MG/1
2.5 TABLET ORAL
Refills: 0 | Status: DISCONTINUED | OUTPATIENT
Start: 2025-02-28 | End: 2025-03-02 | Stop reason: HOSPADM

## 2025-02-28 RX ORDER — SODIUM CHLORIDE, SODIUM LACTATE, POTASSIUM CHLORIDE, AND CALCIUM CHLORIDE .6; .31; .03; .02 G/100ML; G/100ML; G/100ML; G/100ML
1000 INJECTION, SOLUTION INTRAVENOUS ONCE
Status: COMPLETED | OUTPATIENT
Start: 2025-02-28 | End: 2025-02-28

## 2025-02-28 RX ORDER — PROMETHAZINE HYDROCHLORIDE 25 MG/1
12.5-25 SUPPOSITORY RECTAL EVERY 4 HOURS PRN
Status: DISCONTINUED | OUTPATIENT
Start: 2025-02-28 | End: 2025-03-02 | Stop reason: HOSPADM

## 2025-02-28 RX ORDER — ONDANSETRON 4 MG/1
4 TABLET, ORALLY DISINTEGRATING ORAL EVERY 4 HOURS PRN
Status: DISCONTINUED | OUTPATIENT
Start: 2025-02-28 | End: 2025-03-02 | Stop reason: HOSPADM

## 2025-02-28 RX ORDER — OSELTAMIVIR PHOSPHATE 75 MG/1
75 CAPSULE ORAL ONCE
Status: COMPLETED | OUTPATIENT
Start: 2025-02-28 | End: 2025-02-28

## 2025-02-28 RX ORDER — PROCHLORPERAZINE EDISYLATE 5 MG/ML
5-10 INJECTION INTRAMUSCULAR; INTRAVENOUS EVERY 4 HOURS PRN
Status: DISCONTINUED | OUTPATIENT
Start: 2025-02-28 | End: 2025-03-02 | Stop reason: HOSPADM

## 2025-02-28 RX ADMIN — OSELTAMIVIR PHOSPHATE 75 MG: 75 CAPSULE ORAL at 17:25

## 2025-02-28 RX ADMIN — ACETAMINOPHEN 650 MG: 325 TABLET ORAL at 19:24

## 2025-02-28 RX ADMIN — SODIUM CHLORIDE 1000 ML: 9 INJECTION, SOLUTION INTRAVENOUS at 20:24

## 2025-02-28 RX ADMIN — SODIUM CHLORIDE, POTASSIUM CHLORIDE, SODIUM LACTATE AND CALCIUM CHLORIDE 1000 ML: 600; 310; 30; 20 INJECTION, SOLUTION INTRAVENOUS at 17:27

## 2025-02-28 RX ADMIN — POTASSIUM CHLORIDE 40 MEQ: 1500 TABLET, EXTENDED RELEASE ORAL at 23:02

## 2025-02-28 RX ADMIN — POTASSIUM CHLORIDE 40 MEQ: 1500 TABLET, EXTENDED RELEASE ORAL at 19:09

## 2025-02-28 RX ADMIN — ACETAMINOPHEN 650 MG: 325 TABLET ORAL at 15:35

## 2025-02-28 RX ADMIN — KETOROLAC TROMETHAMINE 15 MG: 15 INJECTION, SOLUTION INTRAMUSCULAR; INTRAVENOUS at 18:33

## 2025-02-28 RX ADMIN — OSELTAMIVIR PHOSPHATE 75 MG: 75 CAPSULE ORAL at 19:24

## 2025-02-28 RX ADMIN — POTASSIUM CHLORIDE AND SODIUM CHLORIDE: 900; 300 INJECTION, SOLUTION INTRAVENOUS at 19:18

## 2025-02-28 ASSESSMENT — COGNITIVE AND FUNCTIONAL STATUS - GENERAL
DAILY ACTIVITIY SCORE: 24
SUGGESTED CMS G CODE MODIFIER MOBILITY: CH
SUGGESTED CMS G CODE MODIFIER DAILY ACTIVITY: CH
MOBILITY SCORE: 24

## 2025-02-28 ASSESSMENT — ENCOUNTER SYMPTOMS
FOCAL WEAKNESS: 0
NAUSEA: 0
ORTHOPNEA: 0
HEADACHES: 0
FLANK PAIN: 0
NERVOUS/ANXIOUS: 0
PHOTOPHOBIA: 0
MYALGIAS: 1
BACK PAIN: 0
COUGH: 0
WEIGHT LOSS: 0
PALPITATIONS: 0
SPEECH CHANGE: 0
CHILLS: 1
POLYDIPSIA: 0
TREMORS: 0
DOUBLE VISION: 0
BLURRED VISION: 0
HEMOPTYSIS: 0
SPUTUM PRODUCTION: 0
BRUISES/BLEEDS EASILY: 0
NECK PAIN: 0
VOMITING: 0
HALLUCINATIONS: 0
HEARTBURN: 0
FEVER: 1

## 2025-02-28 ASSESSMENT — LIFESTYLE VARIABLES: SUBSTANCE_ABUSE: 0

## 2025-02-28 ASSESSMENT — FIBROSIS 4 INDEX: FIB4 SCORE: 2.43

## 2025-02-28 ASSESSMENT — PAIN DESCRIPTION - PAIN TYPE: TYPE: ACUTE PAIN

## 2025-02-28 NOTE — ED TRIAGE NOTES
Pt to triage .  Chief Complaint   Patient presents with    Body Aches     Pt c/o body aches, cough, fever x 2 days     Cough    Fever

## 2025-03-01 ENCOUNTER — APPOINTMENT (OUTPATIENT)
Dept: RADIOLOGY | Facility: MEDICAL CENTER | Age: 45
DRG: 760 | End: 2025-03-01
Attending: INTERNAL MEDICINE
Payer: MEDICAID

## 2025-03-01 ENCOUNTER — APPOINTMENT (OUTPATIENT)
Dept: RADIOLOGY | Facility: MEDICAL CENTER | Age: 45
DRG: 760 | End: 2025-03-01
Attending: EMERGENCY MEDICINE
Payer: MEDICAID

## 2025-03-01 LAB
ANION GAP SERPL CALC-SCNC: 9 MMOL/L (ref 7–16)
APPEARANCE UR: ABNORMAL
B-OH-BUTYR SERPL-MCNC: 0.42 MMOL/L (ref 0.02–0.27)
BACTERIA #/AREA URNS HPF: ABNORMAL /HPF
BASOPHILS # BLD AUTO: 0.3 % (ref 0–1.8)
BASOPHILS # BLD: 0.01 K/UL (ref 0–0.12)
BILIRUB UR QL STRIP.AUTO: NEGATIVE
BUN SERPL-MCNC: 12 MG/DL (ref 8–22)
CALCIUM SERPL-MCNC: 7.7 MG/DL (ref 8.5–10.5)
CASTS URNS QL MICRO: ABNORMAL /LPF (ref 0–2)
CHLORIDE SERPL-SCNC: 110 MMOL/L (ref 96–112)
CO2 SERPL-SCNC: 18 MMOL/L (ref 20–33)
COLOR UR: ABNORMAL
CREAT SERPL-MCNC: 0.88 MG/DL (ref 0.5–1.4)
EOSINOPHIL # BLD AUTO: 0.02 K/UL (ref 0–0.51)
EOSINOPHIL NFR BLD: 0.7 % (ref 0–6.9)
EPITHELIAL CELLS 1715: ABNORMAL /HPF (ref 0–5)
ERYTHROCYTE [DISTWIDTH] IN BLOOD BY AUTOMATED COUNT: 55.8 FL (ref 35.9–50)
GFR SERPLBLD CREATININE-BSD FMLA CKD-EPI: 84 ML/MIN/1.73 M 2
GLUCOSE SERPL-MCNC: 103 MG/DL (ref 65–99)
GLUCOSE UR STRIP.AUTO-MCNC: NEGATIVE MG/DL
HCT VFR BLD AUTO: 28.8 % (ref 37–47)
HGB BLD-MCNC: 7.9 G/DL (ref 12–16)
HGB BLD-MCNC: 8.1 G/DL (ref 12–16)
IMM GRANULOCYTES # BLD AUTO: 0.02 K/UL (ref 0–0.11)
IMM GRANULOCYTES NFR BLD AUTO: 0.7 % (ref 0–0.9)
INR PPP: 1.23 (ref 0.87–1.13)
KETONES UR STRIP.AUTO-MCNC: NEGATIVE MG/DL
LACTATE SERPL-SCNC: 1.2 MMOL/L (ref 0.5–2)
LACTATE SERPL-SCNC: 1.2 MMOL/L (ref 0.5–2)
LEUKOCYTE ESTERASE UR QL STRIP.AUTO: ABNORMAL
LYMPHOCYTES # BLD AUTO: 0.92 K/UL (ref 1–4.8)
LYMPHOCYTES NFR BLD: 31.6 % (ref 22–41)
MAGNESIUM SERPL-MCNC: 2 MG/DL (ref 1.5–2.5)
MCH RBC QN AUTO: 18 PG (ref 27–33)
MCHC RBC AUTO-ENTMCNC: 27.4 G/DL (ref 32.2–35.5)
MCV RBC AUTO: 65.8 FL (ref 81.4–97.8)
MICRO URNS: ABNORMAL
MONOCYTES # BLD AUTO: 0.35 K/UL (ref 0–0.85)
MONOCYTES NFR BLD AUTO: 12 % (ref 0–13.4)
NEUTROPHILS # BLD AUTO: 1.59 K/UL (ref 1.82–7.42)
NEUTROPHILS NFR BLD: 54.7 % (ref 44–72)
NITRITE UR QL STRIP.AUTO: NEGATIVE
NRBC # BLD AUTO: 0 K/UL
NRBC BLD-RTO: 0 /100 WBC (ref 0–0.2)
PH UR STRIP.AUTO: 6.5 [PH] (ref 5–8)
PLATELET # BLD AUTO: 161 K/UL (ref 164–446)
PMV BLD AUTO: 10 FL (ref 9–12.9)
POTASSIUM SERPL-SCNC: 3.3 MMOL/L (ref 3.6–5.5)
PROCALCITONIN SERPL-MCNC: 1.47 NG/ML
PROT UR QL STRIP: NEGATIVE MG/DL
PROTHROMBIN TIME: 15.6 SEC (ref 12–14.6)
RBC # BLD AUTO: 4.38 M/UL (ref 4.2–5.4)
RBC # URNS HPF: ABNORMAL /HPF (ref 0–2)
RBC UR QL AUTO: ABNORMAL
SODIUM SERPL-SCNC: 137 MMOL/L (ref 135–145)
SP GR UR STRIP.AUTO: 1.01
UROBILINOGEN UR STRIP.AUTO-MCNC: 0.2 EU/DL
WBC # BLD AUTO: 2.9 K/UL (ref 4.8–10.8)
WBC #/AREA URNS HPF: ABNORMAL /HPF

## 2025-03-01 PROCEDURE — 76830 TRANSVAGINAL US NON-OB: CPT

## 2025-03-01 PROCEDURE — 71045 X-RAY EXAM CHEST 1 VIEW: CPT

## 2025-03-01 PROCEDURE — A9270 NON-COVERED ITEM OR SERVICE: HCPCS | Performed by: INTERNAL MEDICINE

## 2025-03-01 PROCEDURE — 85018 HEMOGLOBIN: CPT

## 2025-03-01 PROCEDURE — 83605 ASSAY OF LACTIC ACID: CPT

## 2025-03-01 PROCEDURE — 700111 HCHG RX REV CODE 636 W/ 250 OVERRIDE (IP): Mod: JZ | Performed by: INTERNAL MEDICINE

## 2025-03-01 PROCEDURE — 87040 BLOOD CULTURE FOR BACTERIA: CPT | Mod: 91

## 2025-03-01 PROCEDURE — 700105 HCHG RX REV CODE 258: Performed by: INTERNAL MEDICINE

## 2025-03-01 PROCEDURE — 83735 ASSAY OF MAGNESIUM: CPT

## 2025-03-01 PROCEDURE — 700102 HCHG RX REV CODE 250 W/ 637 OVERRIDE(OP): Performed by: INTERNAL MEDICINE

## 2025-03-01 PROCEDURE — 99233 SBSQ HOSP IP/OBS HIGH 50: CPT | Performed by: INTERNAL MEDICINE

## 2025-03-01 PROCEDURE — 770020 HCHG ROOM/CARE - TELE (206)

## 2025-03-01 PROCEDURE — 700101 HCHG RX REV CODE 250: Performed by: INTERNAL MEDICINE

## 2025-03-01 PROCEDURE — 81001 URINALYSIS AUTO W/SCOPE: CPT

## 2025-03-01 PROCEDURE — 85025 COMPLETE CBC W/AUTO DIFF WBC: CPT

## 2025-03-01 PROCEDURE — 84145 PROCALCITONIN (PCT): CPT

## 2025-03-01 PROCEDURE — 30233N1 TRANSFUSION OF NONAUTOLOGOUS RED BLOOD CELLS INTO PERIPHERAL VEIN, PERCUTANEOUS APPROACH: ICD-10-PCS | Performed by: EMERGENCY MEDICINE

## 2025-03-01 PROCEDURE — 85610 PROTHROMBIN TIME: CPT

## 2025-03-01 PROCEDURE — 80048 BASIC METABOLIC PNL TOTAL CA: CPT

## 2025-03-01 PROCEDURE — 700111 HCHG RX REV CODE 636 W/ 250 OVERRIDE (IP)

## 2025-03-01 PROCEDURE — 36415 COLL VENOUS BLD VENIPUNCTURE: CPT

## 2025-03-01 RX ORDER — POTASSIUM CHLORIDE 1500 MG/1
40 TABLET, EXTENDED RELEASE ORAL ONCE
Status: COMPLETED | OUTPATIENT
Start: 2025-03-01 | End: 2025-03-01

## 2025-03-01 RX ORDER — SODIUM CHLORIDE 9 MG/ML
INJECTION, SOLUTION INTRAVENOUS CONTINUOUS
Status: DISCONTINUED | OUTPATIENT
Start: 2025-03-01 | End: 2025-03-02

## 2025-03-01 RX ORDER — MAGNESIUM SULFATE HEPTAHYDRATE 40 MG/ML
2 INJECTION, SOLUTION INTRAVENOUS ONCE
Status: COMPLETED | OUTPATIENT
Start: 2025-03-01 | End: 2025-03-01

## 2025-03-01 RX ADMIN — OSELTAMIVIR PHOSPHATE 75 MG: 75 CAPSULE ORAL at 04:34

## 2025-03-01 RX ADMIN — MAGNESIUM SULFATE HEPTAHYDRATE 2 G: 2 INJECTION, SOLUTION INTRAVENOUS at 02:28

## 2025-03-01 RX ADMIN — ACETAMINOPHEN 650 MG: 325 TABLET ORAL at 11:51

## 2025-03-01 RX ADMIN — POTASSIUM CHLORIDE 40 MEQ: 1500 TABLET, EXTENDED RELEASE ORAL at 09:04

## 2025-03-01 RX ADMIN — POTASSIUM CHLORIDE 40 MEQ: 1500 TABLET, EXTENDED RELEASE ORAL at 00:31

## 2025-03-01 RX ADMIN — POTASSIUM CHLORIDE AND SODIUM CHLORIDE: 900; 300 INJECTION, SOLUTION INTRAVENOUS at 04:31

## 2025-03-01 RX ADMIN — ACETAMINOPHEN 650 MG: 325 TABLET ORAL at 20:41

## 2025-03-01 RX ADMIN — OSELTAMIVIR PHOSPHATE 75 MG: 75 CAPSULE ORAL at 17:35

## 2025-03-01 RX ADMIN — SODIUM CHLORIDE 250 MG: 9 INJECTION, SOLUTION INTRAVENOUS at 17:43

## 2025-03-01 RX ADMIN — SODIUM CHLORIDE: 9 INJECTION, SOLUTION INTRAVENOUS at 10:09

## 2025-03-01 SDOH — ECONOMIC STABILITY: TRANSPORTATION INSECURITY
IN THE PAST 12 MONTHS, HAS LACK OF RELIABLE TRANSPORTATION KEPT YOU FROM MEDICAL APPOINTMENTS, MEETINGS, WORK OR FROM GETTING THINGS NEEDED FOR DAILY LIVING?: NO

## 2025-03-01 SDOH — ECONOMIC STABILITY: TRANSPORTATION INSECURITY
IN THE PAST 12 MONTHS, HAS THE LACK OF TRANSPORTATION KEPT YOU FROM MEDICAL APPOINTMENTS OR FROM GETTING MEDICATIONS?: NO

## 2025-03-01 ASSESSMENT — LIFESTYLE VARIABLES
TOTAL SCORE: 0
EVER FELT BAD OR GUILTY ABOUT YOUR DRINKING: NO
DOES PATIENT WANT TO STOP DRINKING: NO
TOTAL SCORE: 0
HAVE YOU EVER FELT YOU SHOULD CUT DOWN ON YOUR DRINKING: NO
AVERAGE NUMBER OF DAYS PER WEEK YOU HAVE A DRINK CONTAINING ALCOHOL: 0
HAVE PEOPLE ANNOYED YOU BY CRITICIZING YOUR DRINKING: NO
ALCOHOL_USE: NO
CONSUMPTION TOTAL: NEGATIVE
TOTAL SCORE: 0
ON A TYPICAL DAY WHEN YOU DRINK ALCOHOL HOW MANY DRINKS DO YOU HAVE: 0
EVER HAD A DRINK FIRST THING IN THE MORNING TO STEADY YOUR NERVES TO GET RID OF A HANGOVER: NO
HOW MANY TIMES IN THE PAST YEAR HAVE YOU HAD 5 OR MORE DRINKS IN A DAY: 0

## 2025-03-01 ASSESSMENT — ENCOUNTER SYMPTOMS
VOMITING: 0
CHILLS: 0
FEVER: 0
BLURRED VISION: 0
WEAKNESS: 0
CONSTIPATION: 0
NAUSEA: 0
PALPITATIONS: 0
SPEECH CHANGE: 0
MYALGIAS: 0
DIZZINESS: 0
COUGH: 0
CLAUDICATION: 0
ABDOMINAL PAIN: 0
DOUBLE VISION: 0
DIARRHEA: 0
NECK PAIN: 0
WEIGHT LOSS: 0
HEMOPTYSIS: 0
PHOTOPHOBIA: 0
ORTHOPNEA: 0

## 2025-03-01 ASSESSMENT — SOCIAL DETERMINANTS OF HEALTH (SDOH)
WITHIN THE PAST 12 MONTHS, YOU WORRIED THAT YOUR FOOD WOULD RUN OUT BEFORE YOU GOT THE MONEY TO BUY MORE: NEVER TRUE
WITHIN THE LAST YEAR, HAVE YOU BEEN AFRAID OF YOUR PARTNER OR EX-PARTNER?: NO
WITHIN THE PAST 12 MONTHS, THE FOOD YOU BOUGHT JUST DIDN'T LAST AND YOU DIDN'T HAVE MONEY TO GET MORE: NEVER TRUE
WITHIN THE LAST YEAR, HAVE TO BEEN RAPED OR FORCED TO HAVE ANY KIND OF SEXUAL ACTIVITY BY YOUR PARTNER OR EX-PARTNER?: NO
WITHIN THE LAST YEAR, HAVE YOU BEEN HUMILIATED OR EMOTIONALLY ABUSED IN OTHER WAYS BY YOUR PARTNER OR EX-PARTNER?: NO
WITHIN THE LAST YEAR, HAVE YOU BEEN KICKED, HIT, SLAPPED, OR OTHERWISE PHYSICALLY HURT BY YOUR PARTNER OR EX-PARTNER?: NO
IN THE PAST 12 MONTHS, HAS THE ELECTRIC, GAS, OIL, OR WATER COMPANY THREATENED TO SHUT OFF SERVICE IN YOUR HOME?: NO

## 2025-03-01 ASSESSMENT — PAIN DESCRIPTION - PAIN TYPE
TYPE: ACUTE PAIN

## 2025-03-01 ASSESSMENT — PATIENT HEALTH QUESTIONNAIRE - PHQ9
1. LITTLE INTEREST OR PLEASURE IN DOING THINGS: NOT AT ALL
SUM OF ALL RESPONSES TO PHQ9 QUESTIONS 1 AND 2: 0
2. FEELING DOWN, DEPRESSED, IRRITABLE, OR HOPELESS: NOT AT ALL

## 2025-03-01 ASSESSMENT — FIBROSIS 4 INDEX: FIB4 SCORE: 2.54

## 2025-03-01 NOTE — CARE PLAN
The patient is Watcher - Medium risk of patient condition declining or worsening    Shift Goals  Clinical Goals: VSS, safety  Patient Goals: rest  Family Goals: major    Progress made toward(s) clinical / shift goals:    Problem: Hemodynamics  Goal: Patient's hemodynamics, fluid balance and neurologic status will be stable or improve  Outcome: Progressing  Note: Patient's BP responded to IV fluids and RBC.      Problem: Fluid Volume  Goal: Fluid volume balance will be maintained  Outcome: Progressing  Note: Patient received IV fluid boluses, remains on room air, SBP now above 90.        Patient is not progressing towards the following goals:

## 2025-03-01 NOTE — PROGRESS NOTES
Bedside report received from off going RN/tech: KISHAN Regalado, assumed care of patient.     Fall Risk Score: MODERATE RISK  Fall risk interventions in place: Place yellow fall risk ID band on patient, Provide patient/family education based on risk assessment, Educate patient/family to call staff for assistance when getting out of bed, Place fall precaution signage outside patient door, Utilize bed/chair fall alarm, and Bed alarm connected correctly  Bed type: Regular (John Score less than 17 interventions in place)  Patient on cardiac monitor: Yes  IVF/IV medications: Infusion per MAR (List Med(s)) NaCl w/ Kcl 100ml/hr  Oxygen: Room Air  Bedside sitter: Not Applicable   Isolation: Isolation precautions in place

## 2025-03-01 NOTE — ASSESSMENT & PLAN NOTE
Due to vaginal bleeding, heavy periods  Denied any abdominal pain, ultrasound did not show any acute finding  IV iron  Follow-up with OB as outpatient

## 2025-03-01 NOTE — ED PROVIDER NOTES
ER Provider Note    Scribed for Carlos Eduardo Sam M.d. by Liliane Oliveira. 2/28/2025  4:54 PM    Primary Care Provider: None pertinent     CHIEF COMPLAINT   Chief Complaint   Patient presents with    Body Aches     Pt c/o body aches, cough, fever x 2 days     Cough    Fever     HPI/ROS  LIMITATION TO HISTORY   Select: Language Bahamian,  Used   OUTSIDE HISTORIAN(S):   at bedside    Katlyn Hillman is a 45 y.o. female who presents to the ED for evaluation of flu-like symptoms onset yesterday. Patient reports body aches, head pain, chills, cough, and fever, but denies any vomiting, diarrhea, or sick contacts at home. Patient describes pain to be all over her body. She treated her symptoms with tylenol yesterday. She denies vaginal bleeding, vaginal discharge, hematochezia, or hematuria. Denies a past major medical history or use of daily medications. She started her menstrual cycle yesterday, and describes it as light today. Denies a chance of pregnancy. Patient is not established with OB-GYN.    PAST MEDICAL HISTORY  Past Medical History:   Diagnosis Date    Anemia 2/28/2025       SURGICAL HISTORY  Past Surgical History:   Procedure Laterality Date    REPEAT C SECTION W TUBAL LIGATION  4/3/2014    Performed by Soledad Chicas M.D. at [facility]    PRIMARY C SECTION         FAMILY HISTORY  None noted     SOCIAL HISTORY   reports that she has never smoked. She has never used smokeless tobacco. She reports that she does not drink alcohol and does not use drugs.    CURRENT MEDICATIONS  Current Discharge Medication List        CONTINUE these medications which have NOT CHANGED    Details   ACETAMINOPHEN PO Take 2 Tablets by mouth every 6 hours as needed for Mild Pain.             ALLERGIES  Patient has no known allergies.    PHYSICAL EXAM  /55   Pulse 97   Temp (!) 39.2 °C (102.5 °F) (Oral)   Resp 16   Wt 74.4 kg (164 lb)   LMP 02/28/2025   SpO2 98%   BMI 30.00 kg/m²   Constitutional:  Alert in no apparent distress.  HENT: Normocephalic, Atraumatic, Bilateral external ears normal. Nose normal.   Eyes: Pupils are equal and reactive. Conjunctiva normal, non-icteric.   Heart: Regular rate and rhythm, no murmurs.    Lungs: Clear to auscultation bilaterally.  Skin: Warm, Dry, No erythema, No rash.   Neurologic: Alert, Grossly non-focal.   Psychiatric: Affect normal, Judgment normal, Mood normal, Appears appropriate and not intoxicated.        DIAGNOSTIC STUDIES    EKG/LABS  Labs Reviewed   CBC WITH DIFFERENTIAL - Abnormal; Notable for the following components:       Result Value    WBC 4.0 (*)     Hemoglobin 6.9 (*)     Hematocrit 26.1 (*)     MCV 60.0 (*)     MCH 15.9 (*)     MCHC 26.4 (*)     Neutrophils-Polys 80.40 (*)     Lymphocytes 8.10 (*)     Lymphs (Absolute) 0.32 (*)     Hypochromia 2+ (*)     Microcytosis 3+ (*)     All other components within normal limits   COMP METABOLIC PANEL - Abnormal; Notable for the following components:    Sodium 131 (*)     Potassium 2.5 (*)     Co2 16 (*)     Glucose 118 (*)     Calcium 8.4 (*)     Alkaline Phosphatase 117 (*)     Globulin 4.0 (*)     All other components within normal limits   PROTHROMBIN TIME - Abnormal; Notable for the following components:    PT 16.1 (*)     INR 1.29 (*)     All other components within normal limits   APTT - Abnormal; Notable for the following components:    APTT 38.7 (*)     All other components within normal limits   RETICULOCYTES COUNT - Abnormal; Notable for the following components:    Imm. Reticulocyte Fraction 23.7 (*)     Retic Hgb Equivalent 17.1 (*)     All other components within normal limits   IRON/TOTAL IRON BIND - Abnormal; Notable for the following components:    Iron 23 (*)     Unsat Iron Binding 416 (*)     % Saturation 5 (*)     All other components within normal limits   LACTIC ACID - Abnormal; Notable for the following components:    Lactic Acid 2.1 (*)     All other components within normal limits   POC  COV-2, FLU A/B, RSV BY PCR - Abnormal; Notable for the following components:    POC Influenza A RNA, PCR POSITIVE (*)     All other components within normal limits   HCG QUAL SERUM   PLATELET ESTIMATE   MORPHOLOGY   PERIPHERAL SMEAR REVIEW   DIFFERENTIAL COMMENT   ESTIMATED GFR   COD (ADULT)   FOLATE   VITAMIN B12   FERRITIN   BETA-HYDROXYBUTYRIC ACID   POCT COV-2, FLU A/B, RSV BY PCR   RELEASE RED BLOOD CELLS   TRANSFUSE RED BLOOD CELLS-NURSING COMMUNICATION (UNITS)   All labs reviewed by me.     RADIOLOGY/PROCEDURES   The attending emergency physician has independently interpreted the diagnostic imaging associated with this visit and am waiting the final reading from the radiologist.   My preliminary interpretation is a follows: pending    Radiologist interpretation:  US-PELVIC COMPLETE (TRANSABDOMINAL/TRANSVAGINAL) (COMBO)    (Results Pending)         COURSE & MEDICAL DECISION MAKING     ASSESSMENT, COURSE AND PLAN  Care Narrative: 45 y.o. F p/w CC of febrile illness    4:55 PM - Patient seen and examined at bedside. I discussed the patient's diagnostic study results which show a positive result for Influenza A. Discussed plan of care, including treating her with fluids.    Intravenous fluids administered for influenza A and hypotension.  Patient not appropriate for oral rehydration, as surgical process needs to be ruled out before trial of oral rehydration.   On repeat evaluation, improved after fluids.  Pt w/ positive fluid response.    6:27 PM - Review of laboratory results reveal hemoglobin of 6.9. Ordered bed for admission.     6:45 PM - I re-evaluated patient at bedside. I discussed her blood work, and my plan for admission. She understands and agrees.     6:50 PM - I discussed the patient's case and the above findings with Dr. Chaudhary (hospitalist) who agrees to admit the patient.     6:52 PM - Paged GYN.      7:08 PM - I discussed the patient's case and the above findings with Dr. Lim (OB) who agrees  to see patient for outpatient follow-up.    Patient with incidental finding of anemia.  Patient denies heavy vaginal bleeding but states that she has regular periods.  Patient will be admitted for blood and for ultrasound which is pending at time of admission  Patient given Tamiflu  Patient hypokalemic and will be given potassium  Patient denies any Coumadin however INR is elevated  Discussed patient's case with on-call GYN who states that patient is appropriate for transfusion and follow-up as outpatient and she will place name with  to help coordinate patient's outpatient follow-up      DISPOSITION AND DISCUSSIONS  I have discussed management of the patient with the following physicians and JESS's:  Dr. Chaudhary (hospitalist),  Dr. Lim (OB)    Discussion of management with other \[facility] [place] [facility]\"" or appropriate source(s): None     Barriers to care at this time, including but not limited to: Patient does not have established PCP.       FINAL IMPRESSION  1. Body aches    2. Influenza A    3. Acute febrile illness    4. Anemia, unspecified type      Patient admitted in guarded condition.     -ADMIT-       Liliane ALLRED (Channing), am scribing for, and in the presence of, Carlos Eduardo Sam M.D..    Electronically signed by: Liliane Oliveira (Channing), 2/28/2025    ICarlos Eduardo M.D. personally performed the services described in this documentation, as scribed by Liliane Oliveira in my presence, and it is both accurate and complete.       The note accurately reflects work and decisions made by me.  Carlos Eduardo Sam M.D.  2/28/2025  10:58 PM

## 2025-03-01 NOTE — PROGRESS NOTES
Bedside report received from off going RN/tech: ED RN, assumed care of patient.     Fall Risk Score: (P) LOW RISK  Fall risk interventions in place: Provide patient/family education based on risk assessment  Bed type: Regular (John Score less than 17 interventions in place)  Patient on cardiac monitor: Yes  IVF/IV medications: Infusion per MAR (List Med(s)) NS w/ K  Oxygen: Room Air  Bedside sitter: Not Applicable   Isolation: Isolation precautions in place

## 2025-03-01 NOTE — CARE PLAN
The patient is Stable - Low risk of patient condition declining or worsening    Shift Goals  Clinical Goals: Safety, VSS  Patient Goals: Go home  Family Goals: ISAIAS    Progress made toward(s) clinical / shift goals:        Problem: Knowledge Deficit - Standard  Goal: Patient and family/care givers will demonstrate understanding of plan of care, disease process/condition, diagnostic tests and medications  Outcome: Progressing     Problem: Hemodynamics  Goal: Patient's hemodynamics, fluid balance and neurologic status will be stable or improve  Outcome: Progressing     Problem: Fluid Volume  Goal: Fluid volume balance will be maintained  Outcome: Progressing          tolerating feeds/nontender

## 2025-03-01 NOTE — PROGRESS NOTES
ISOLATION PRECAUTIONS EDUCATION    Educated PATIENT, FAMILY, S.O: patient on isolation for INFLUENZA.    Educated on reason for isolation, how the infection may be transmitted, and how to help prevent transmission to others. Educated precautions involves staff and visitors wearing PPE, following Standard Precautions and performing meticulous hand hygiene in order to prevent transmission of infection.     Droplet Precautions: Educated that Droplet Precautions involves staff and visitors wearing PPE to include a surgical mask when in the patient room.     In addition, educated that they may leave their room, but prior to exiting the patient room each time, the patient needs to have on a fresh patient gown, a surgical mask must be worn by the patient while out of the patient room, and perform hand hygiene immediately prior to exiting the room.     Patient transport and mobilization on unit  Educated that they may leave their room, but prior to exiting, the patient needs to have on a fresh patient gown, ensure the potentially infectious area is covered, performing appropriate hand hygiene immediately prior to exiting the room.

## 2025-03-01 NOTE — HOSPITAL COURSE
45-year-old female with history of chronic back pain who presented 2/28 with chills, fever and coughing with body ache.  On admission patient was found to have fever, patient did not need oxygen, she was on room air, chest x-ray did not show any significant infiltration or pneumonia however influenza A was positive, Tamiflu was started.    Patient was found to have anemia, patient states she has heavy menses, pelvic ultrasound did not show any mass, patient received IV iron and blood transfusion due to severe anemia hemoglobin around 6.

## 2025-03-01 NOTE — ASSESSMENT & PLAN NOTE
fever, chills, cough, body aches with onset yesterday  Started Tamiflu  No need for oxygen and chest x-ray did not show any pneumonia

## 2025-03-01 NOTE — ED NOTES
BREAK RN:  Pt medicated per MAR, education provided, pt verbalized understanding.   Temp re-taken oral 100.5F.   T8 transport at bedside.

## 2025-03-01 NOTE — ED NOTES
Lab called with critical result of potassium of 2.5 at 1826.  Dr. Sam notified of critical lab result at 1827.

## 2025-03-01 NOTE — ED NOTES
Medication history reviewed with patient at bedside.   Med rec is complete  Allergies reviewed.     Patient has not had any outpatient antibiotics in the last 30 days.   Anticoagulants: No    Annia Peraza

## 2025-03-01 NOTE — PROGRESS NOTES
4 Eyes Skin Assessment Completed by KISHAN Regalado and KISHAN Bishop.    Head WDL  Ears WDL  Nose WDL  Mouth WDL  Neck WDL  Breast/Chest WDL  Shoulder Blades WDL  Spine WDL  (R) Arm/Elbow/Hand WDL  (L) Arm/Elbow/Hand WDL  Abdomen Scar  Groin WDL  Scrotum/Coccyx/Buttocks WDL  (R) Leg WDL  (L) Leg WDL  (R) Heel/Foot/Toe Dry, cracked heels  (L) Heel/Foot/Toe Dry, cracked heels          Devices In Places Tele Box      Interventions In Place Pillows    Possible Skin Injury No    Pictures Uploaded Into Epic N/A  Wound Consult Placed N/A  RN Wound Prevention Protocol Ordered No

## 2025-03-01 NOTE — H&P
Hospital Medicine History & Physical Note    Date of Service  2/28/2025    Primary Care Physician  Pcp Pt States None        Code Status  Full Code    Chief Complaint  Chief Complaint   Patient presents with    Body Aches     Pt c/o body aches, cough, fever x 2 days     Cough    Fever       History of Presenting Illness  Katlyn Hillman is a 45 y.o. female with history of chronic back pain who presented 2/28/2025 with complaints of chills, fever, cough, malaise, body aches since yesterday.  In the ER she tested positive for influenza A, given Tamiflu.  Blood pressure is soft and low at some point 87/48.  Temperature 102.9, tachycardia 109, on room air.  She was given 1 L of Ringer lactate  Blood work abnormalities include hemoglobin 6.9 and potassium 2.5, bicarb 16  According to the patient she has been having heavy menses chronically.  Currently she has mild menstrual bleeding.  ERP Dr. Sam is to call OB/GYN for recommendations.  Pelvic ultrasound ordered  I discussed the plan of care with patient, bedside RN, and ERP .    Review of Systems  Review of Systems   Constitutional:  Positive for chills, fever and malaise/fatigue. Negative for weight loss.   HENT:  Negative for ear pain, hearing loss and tinnitus.    Eyes:  Negative for blurred vision, double vision and photophobia.   Respiratory:  Negative for cough, hemoptysis and sputum production.    Cardiovascular:  Negative for chest pain, palpitations and orthopnea.   Gastrointestinal:  Negative for heartburn, nausea and vomiting.   Genitourinary:  Negative for dysuria, flank pain, frequency and hematuria.   Musculoskeletal:  Positive for myalgias. Negative for back pain and neck pain.   Skin:  Negative for itching and rash.   Neurological:  Negative for tremors, speech change, focal weakness and headaches.   Endo/Heme/Allergies:  Negative for environmental allergies and polydipsia. Does not bruise/bleed easily.   Psychiatric/Behavioral:  Negative for hallucinations  and substance abuse. The patient is not nervous/anxious.        Past Medical History   has a past medical history of Anemia (2/28/2025).    Surgical History   has a past surgical history that includes primary c section and repeat c section w tubal ligation (4/3/2014).     Family History  family history is not on file.   Family history reviewed with patient. There is no family history that is pertinent to the chief complaint.     Social History   reports that she has never smoked. She has never used smokeless tobacco. She reports that she does not drink alcohol and does not use drugs.    Allergies  No Known Allergies    Medications  None       Physical Exam  Temp:  [39.2 °C (102.5 °F)-40.2 °C (104.4 °F)] 39.4 °C (102.9 °F)  Pulse:  [] 101  Resp:  [16] 16  BP: ()/(48-62) 110/53  SpO2:  [93 %-98 %] 93 %  Blood Pressure: 110/53   Temperature: (!) 39.4 °C (102.9 °F)   Pulse: (!) 101   Respiration: 16   Pulse Oximetry: 93 %       Physical Exam  Vitals and nursing note reviewed.   Constitutional:       General: She is not in acute distress.     Appearance: Normal appearance. She is ill-appearing.   HENT:      Head: Normocephalic and atraumatic.      Nose: Nose normal.      Mouth/Throat:      Mouth: Mucous membranes are moist.      Pharynx: Posterior oropharyngeal erythema present.   Eyes:      Extraocular Movements: Extraocular movements intact.      Pupils: Pupils are equal, round, and reactive to light.   Cardiovascular:      Rate and Rhythm: Regular rhythm. Tachycardia present.   Pulmonary:      Effort: Pulmonary effort is normal.      Breath sounds: Normal breath sounds.   Abdominal:      General: Abdomen is flat. There is no distension.      Tenderness: There is no abdominal tenderness.   Musculoskeletal:         General: No swelling or deformity. Normal range of motion.      Cervical back: Normal range of motion and neck supple.   Skin:     General: Skin is warm and dry.      Coloration: Skin is pale.  "  Neurological:      General: No focal deficit present.      Mental Status: She is alert and oriented to person, place, and time.   Psychiatric:         Mood and Affect: Mood normal.         Behavior: Behavior normal.         Laboratory:  Recent Labs     02/28/25  1728   WBC 4.0*   RBC 4.35   HEMOGLOBIN 6.9*   HEMATOCRIT 26.1*   MCV 60.0*   MCH 15.9*   MCHC 26.4*   RDW 42.3   PLATELETCT 168     Recent Labs     02/28/25  1728   SODIUM 131*   POTASSIUM 2.5*   CHLORIDE 101   CO2 16*   GLUCOSE 118*   BUN 11   CREATININE 1.05   CALCIUM 8.4*     Recent Labs     02/28/25  1728   ALTSGPT 16   ASTSGOT 38   ALKPHOSPHAT 117*   TBILIRUBIN 1.1   GLUCOSE 118*         No results for input(s): \"NTPROBNP\" in the last 72 hours.      No results for input(s): \"TROPONINT\" in the last 72 hours.    Imaging:  US-PELVIC COMPLETE (TRANSABDOMINAL/TRANSVAGINAL) (COMBO)    (Results Pending)         Assessment/Plan:  Justification for Admission Status  I anticipate this patient will require at least two midnights for appropriate medical management, necessitating inpatient admission because acute blood loss anemia, vaginal bleeding    Patient will need a Telemetry bed on MEDICAL service .  The need is secondary to hypotension.    * Acute on chronic blood loss anemia- (present on admission)  Assessment & Plan  Presumed secondary to heavy menses  Hemoglobin 6.9, transfusion of 1 unit of RBC pending  We will check iron studies and consider IV iron replacement  OB/GYN consultation is pending for vaginal bleeding, pelvic ultrasound pending  Trend H&H, transfuse as needed      Hyponatremia  Assessment & Plan  Sodium 131.  Probably secondary to influenza  Repeat BMP    Normal anion gap metabolic acidosis  Assessment & Plan  Bicarb 16 with normal anion gap.  Etiology is unclear  Will check lactic acid and BHB levels    Hypokalemia  Assessment & Plan  Potassium 2.5  Ordered IV and p.o. replacement  Repeat BMP    Influenza A  Assessment & Plan   fever, " chills, cough, body aches with onset yesterday  Plan: Continue Tamiflu  Tylenol as needed for fever  Guaifenesin for cough        VTE prophylaxis: SCDs/TEDs

## 2025-03-01 NOTE — PROGRESS NOTES
[facility] Medicine Daily Progress Note    Date of Service  3/1/2025    Chief Complaint  Katlyn Hillman is a 45 y.o. female admitted 2/28/2025 with shortness of breath and coughing    Hospital Course:    45-year-old female with history of chronic back pain who presented 2/28 with chills, fever and coughing with body ache.  On admission patient was found to have fever, patient did not need oxygen, she was on room air, chest x-ray did not show any significant infiltration or pneumonia however influenza A was positive, Tamiflu was started.    Patient was found to have anemia, patient states she has heavy menses, pelvic ultrasound did not show any mass, patient received IV iron and blood transfusion due to severe anemia hemoglobin around 6.    Interval Problem Update  -Evaluate and examined the patient at bedside, patient feels better, patient is on room air, no fever  -Sepsis workup  -Tamiflu  -IV iron and blood transfusion, continue monitoring the patient on the day and possible discharge tomorrow      I have discussed this patient's plan of care and discharge plan at IDT rounds today with Case Management, Nursing, Nursing leadership, and other members of the IDT team.    Consultants/Specialty  OB/GYN    Code Status  Full Code    Disposition  The patient is not medically cleared for discharge to home or a post-acute facility.  Anticipate discharge to: home with close outpatient follow-up    I have placed the appropriate orders for post-discharge needs.    Review of Systems  Review of Systems   Constitutional:  Positive for malaise/fatigue. Negative for chills, fever and weight loss.   HENT:  Negative for ear pain, hearing loss and tinnitus.    Eyes:  Negative for blurred vision, double vision and photophobia.   Respiratory:  Negative for cough and hemoptysis.    Cardiovascular:  Negative for chest pain, palpitations, orthopnea and claudication.   Gastrointestinal:  Negative for abdominal pain, constipation, diarrhea, nausea  and vomiting.   Genitourinary:  Negative for dysuria, frequency and urgency.   Musculoskeletal:  Negative for myalgias and neck pain.   Skin:  Negative for rash.   Neurological:  Negative for dizziness, speech change and weakness.        Physical Exam  Temp:  [36.5 °C (97.7 °F)-40.2 °C (104.4 °F)] 36.9 °C (98.4 °F)  Pulse:  [] 83  Resp:  [14-28] 16  BP: ()/(48-62) 95/55  SpO2:  [92 %-98 %] 94 %    Physical Exam  Constitutional:       General: She is not in acute distress.     Appearance: She is not ill-appearing.   Cardiovascular:      Rate and Rhythm: Normal rate.      Heart sounds: No murmur heard.  Pulmonary:      Effort: No respiratory distress.      Breath sounds: No wheezing.   Abdominal:      General: There is no distension.      Palpations: Abdomen is soft.      Tenderness: There is no abdominal tenderness. There is no guarding.   Musculoskeletal:      Right lower leg: No edema.      Left lower leg: No edema.   Skin:     Coloration: Skin is pale. Skin is not jaundiced.      Findings: No bruising, lesion or rash.   Neurological:      General: No focal deficit present.      Mental Status: She is oriented to person, place, and time. Mental status is at baseline.         Fluids    Intake/Output Summary (Last 24 hours) at 3/1/2025 1340  Last data filed at 3/1/2025 1011  Gross per 24 hour   Intake 2151.25 ml   Output 26 ml   Net 2125.25 ml        Laboratory  Recent Labs     02/28/25  1728 03/01/25  0105 03/01/25  1246   WBC 4.0* 2.9*  --    RBC 4.35 4.38  --    HEMOGLOBIN 6.9* 7.9* 8.1*   HEMATOCRIT 26.1* 28.8*  --    MCV 60.0* 65.8*  --    MCH 15.9* 18.0*  --    MCHC 26.4* 27.4*  --    RDW 42.3 55.8*  --    PLATELETCT 168 161*  --    MPV  --  10.0  --      Recent Labs     02/28/25  1728 03/01/25  0105   SODIUM 131* 137   POTASSIUM 2.5* 3.3*   CHLORIDE 101 110   CO2 16* 18*   GLUCOSE 118* 103*   BUN 11 12   CREATININE 1.05 0.88   CALCIUM 8.4* 7.7*     Recent Labs     02/28/25  1728 03/01/25  0832    APTT 38.7*  --    INR 1.29* 1.23*               Imaging  US-PELVIC COMPLETE (TRANSABDOMINAL/TRANSVAGINAL) (COMBO)   Final Result         1.  Endometrium normal in 1.23 cm. No uterine mass or enlargement.      2.  Small 1.5 cm right ovarian cyst is likely functional. Normal blood flow to both ovaries by Doppler.      3.  Trace free pelvic fluid is within normal limits.      DX-CHEST-LIMITED (1 VIEW)   Final Result   Impression:      1. No acute cardiac or pulmonary abnormality is identified.                 Assessment/Plan  * Acute on chronic blood loss anemia- (present on admission)  Assessment & Plan  Due to vaginal bleeding, heavy periods  Denied any abdominal pain, ultrasound did not show any acute finding  IV iron  Follow-up with OB as outpatient    Influenza A  Assessment & Plan   fever, chills, cough, body aches with onset yesterday  Started Tamiflu  No need for oxygen and chest x-ray did not show any pneumonia    Hyponatremia  Assessment & Plan  Resolved    Normal anion gap metabolic acidosis  Assessment & Plan  Improving  Continue IV fluid  Oral bicarb    Hypokalemia  Assessment & Plan  Potassium 2.5  Improving  Replace phosphorus and magnesium         VTE prophylaxis:   SCDs/TEDs   pharmacologic prophylaxis contraindicated due to Vaginal bleeding      I have performed a physical exam and reviewed and updated ROS and Plan today (3/1/2025). In review of yesterday's note (2/28/2025), there are no changes except as documented above.    Greater than 51 minutes spent prepping to see patient (e.g. review of tests) obtaining and/or reviewing separately obtained history. Performing a medically appropriate examination and/ evaluation.  Counseling and educating the patient/family/caregiver.  Ordering medications, tests, or procedures.  Referring and communicating with other health care professionals.  Documenting clinical information in EPIC.  Independently interpreting results and communicating results to  patient/family/caregiver.  Care coordination

## 2025-03-02 VITALS
OXYGEN SATURATION: 93 % | TEMPERATURE: 98.1 F | HEART RATE: 66 BPM | DIASTOLIC BLOOD PRESSURE: 57 MMHG | WEIGHT: 157.85 LBS | RESPIRATION RATE: 17 BRPM | SYSTOLIC BLOOD PRESSURE: 99 MMHG | HEIGHT: 62 IN | BODY MASS INDEX: 29.05 KG/M2

## 2025-03-02 LAB
ALBUMIN SERPL BCP-MCNC: 3.3 G/DL (ref 3.2–4.9)
ALBUMIN/GLOB SERPL: 1 G/DL
ALP SERPL-CCNC: 91 U/L (ref 30–99)
ALT SERPL-CCNC: 16 U/L (ref 2–50)
ANION GAP SERPL CALC-SCNC: 9 MMOL/L (ref 7–16)
AST SERPL-CCNC: 41 U/L (ref 12–45)
BILIRUB SERPL-MCNC: 0.5 MG/DL (ref 0.1–1.5)
BUN SERPL-MCNC: 6 MG/DL (ref 8–22)
CALCIUM ALBUM COR SERPL-MCNC: 8.7 MG/DL (ref 8.5–10.5)
CALCIUM SERPL-MCNC: 8.1 MG/DL (ref 8.5–10.5)
CHLORIDE SERPL-SCNC: 113 MMOL/L (ref 96–112)
CO2 SERPL-SCNC: 17 MMOL/L (ref 20–33)
CREAT SERPL-MCNC: 0.65 MG/DL (ref 0.5–1.4)
ERYTHROCYTE [DISTWIDTH] IN BLOOD BY AUTOMATED COUNT: 54.5 FL (ref 35.9–50)
GFR SERPLBLD CREATININE-BSD FMLA CKD-EPI: 112 ML/MIN/1.73 M 2
GLOBULIN SER CALC-MCNC: 3.4 G/DL (ref 1.9–3.5)
GLUCOSE SERPL-MCNC: 82 MG/DL (ref 65–99)
HCT VFR BLD AUTO: 31.1 % (ref 37–47)
HGB BLD-MCNC: 8.4 G/DL (ref 12–16)
MAGNESIUM SERPL-MCNC: 2 MG/DL (ref 1.5–2.5)
MCH RBC QN AUTO: 17.9 PG (ref 27–33)
MCHC RBC AUTO-ENTMCNC: 27 G/DL (ref 32.2–35.5)
MCV RBC AUTO: 66.2 FL (ref 81.4–97.8)
PLATELET # BLD AUTO: 130 K/UL (ref 164–446)
POTASSIUM SERPL-SCNC: 3.9 MMOL/L (ref 3.6–5.5)
PROT SERPL-MCNC: 6.7 G/DL (ref 6–8.2)
RBC # BLD AUTO: 4.7 M/UL (ref 4.2–5.4)
SODIUM SERPL-SCNC: 139 MMOL/L (ref 135–145)
TSH SERPL DL<=0.005 MIU/L-ACNC: 1.27 UIU/ML (ref 0.38–5.33)
WBC # BLD AUTO: 4.6 K/UL (ref 4.8–10.8)

## 2025-03-02 PROCEDURE — 99239 HOSP IP/OBS DSCHRG MGMT >30: CPT | Performed by: INTERNAL MEDICINE

## 2025-03-02 PROCEDURE — 80053 COMPREHEN METABOLIC PANEL: CPT

## 2025-03-02 PROCEDURE — 85027 COMPLETE CBC AUTOMATED: CPT

## 2025-03-02 PROCEDURE — 700111 HCHG RX REV CODE 636 W/ 250 OVERRIDE (IP): Mod: JZ | Performed by: INTERNAL MEDICINE

## 2025-03-02 PROCEDURE — 90471 IMMUNIZATION ADMIN: CPT

## 2025-03-02 PROCEDURE — A9270 NON-COVERED ITEM OR SERVICE: HCPCS | Performed by: INTERNAL MEDICINE

## 2025-03-02 PROCEDURE — 36415 COLL VENOUS BLD VENIPUNCTURE: CPT

## 2025-03-02 PROCEDURE — 700105 HCHG RX REV CODE 258: Performed by: INTERNAL MEDICINE

## 2025-03-02 PROCEDURE — 90656 IIV3 VACC NO PRSV 0.5 ML IM: CPT | Performed by: INTERNAL MEDICINE

## 2025-03-02 PROCEDURE — 84443 ASSAY THYROID STIM HORMONE: CPT

## 2025-03-02 PROCEDURE — 700102 HCHG RX REV CODE 250 W/ 637 OVERRIDE(OP): Performed by: INTERNAL MEDICINE

## 2025-03-02 PROCEDURE — 3E02340 INTRODUCTION OF INFLUENZA VACCINE INTO MUSCLE, PERCUTANEOUS APPROACH: ICD-10-PCS | Performed by: INTERNAL MEDICINE

## 2025-03-02 PROCEDURE — 83735 ASSAY OF MAGNESIUM: CPT

## 2025-03-02 RX ORDER — OSELTAMIVIR PHOSPHATE 75 MG/1
75 CAPSULE ORAL 2 TIMES DAILY
Qty: 8 CAPSULE | Refills: 0 | Status: ACTIVE | OUTPATIENT
Start: 2025-03-02 | End: 2025-03-06

## 2025-03-02 RX ADMIN — INFLUENZA A VIRUS A/VICTORIA/4897/2022 IVR-238 (H1N1) ANTIGEN (FORMALDEHYDE INACTIVATED), INFLUENZA A VIRUS A/CALIFORNIA/122/2022 SAN-022 (H3N2) ANTIGEN (FORMALDEHYDE INACTIVATED), AND INFLUENZA B VIRUS B/MICHIGAN/01/2021 ANTIGEN (FORMALDEHYDE INACTIVATED) 0.5 ML: 15; 15; 15 INJECTION, SUSPENSION INTRAMUSCULAR at 08:36

## 2025-03-02 RX ADMIN — SODIUM CHLORIDE 250 MG: 9 INJECTION, SOLUTION INTRAVENOUS at 05:34

## 2025-03-02 RX ADMIN — OSELTAMIVIR PHOSPHATE 75 MG: 75 CAPSULE ORAL at 05:31

## 2025-03-02 ASSESSMENT — PAIN DESCRIPTION - PAIN TYPE: TYPE: ACUTE PAIN

## 2025-03-02 ASSESSMENT — FIBROSIS 4 INDEX: FIB4 SCORE: 3.39

## 2025-03-02 NOTE — DISCHARGE SUMMARY
Discharge Summary    CHIEF COMPLAINT ON ADMISSION  Chief Complaint   Patient presents with    Body Aches     Pt c/o body aches, cough, fever x 2 days     Cough    Fever       Reason for Admission  Anemia due to blood loss  Influenza A infection    Admission Date  2/28/2025    CODE STATUS  Full Code    HPI & HOSPITAL COURSE    45-year-old female with history of chronic back pain who presented 2/28 with chills, fever and coughing with body ache.  On admission patient was found to have fever, patient did not need oxygen, she was on room air, chest x-ray did not show any significant infiltration or pneumonia however influenza A was positive, Tamiflu was started.  Her symptoms were improved and patient did not need oxygen therapy.    Patient was found to have anemia, patient states she has heavy menses, pelvic ultrasound did not show any mass, patient received IV iron and blood transfusion due to severe anemia hemoglobin around 6.  Patient received IV iron, no signs of GI bleeding, denied any active vaginal bleeding during hospitalization, patient was encouraged to follow-up with her OB/GYN as outpatient.    On the day of discharge the patient was alert oriented x 4, vital signs were stable, her hemoglobin stable around 8 for last 24-hour with a stable kidney function and no dizziness, she was not on oxygen therapy and chest x-ray did not show any acute finding or pneumonia, patient feels okay for discharge to follow-up with her PCP and OB/GYN as outpatient.    Therefore, she is discharged in good and stable condition to home with close outpatient follow-up.    The patient met 2-midnight criteria for an inpatient stay at the time of discharge.    Discharge Date  03/02/25      FOLLOW UP ITEMS POST DISCHARGE  Follow-up with PCP and OB/GYN for vaginal bleeding    DISCHARGE DIAGNOSES  Principal Problem:    Acute on chronic blood loss anemia (POA: Yes)  Active Problems:    Influenza A (POA: Unknown)    Hypokalemia (POA:  Unknown)    Normal anion gap metabolic acidosis (POA: Unknown)    Hyponatremia (POA: Unknown)  Resolved Problems:    * No resolved hospital problems. *      FOLLOW UP  Future Appointments   Date Time Provider Department Center   3/11/2025  1:30 PM REYNA Tristan     No follow-up provider specified.    MEDICATIONS ON DISCHARGE     Medication List        START taking these medications        Instructions   ibuprofen 600 MG Tabs  Commonly known as: Motrin   Take 1 Tablet by mouth every 6 hours as needed for Moderate Pain for up to 7 days.  Dose: 600 mg     oseltamivir 75 MG Caps  Commonly known as: Tamiflu   Take 1 Capsule by mouth 2 times a day for 4 days.  Dose: 75 mg            CONTINUE taking these medications        Instructions   ACETAMINOPHEN PO   Take 2 Tablets by mouth every 6 hours as needed for Mild Pain.  Dose: 2 Tablet              Allergies  No Known Allergies    DIET  Orders Placed This Encounter   Procedures    Diet Order Diet: Regular     Standing Status:   Standing     Number of Occurrences:   1     Diet::   Regular [1]       ACTIVITY  As tolerated.  Weight bearing as tolerated    CONSULTATIONS  None    PROCEDURES  None    LABORATORY  Lab Results   Component Value Date    SODIUM 139 03/02/2025    POTASSIUM 3.9 03/02/2025    CHLORIDE 113 (H) 03/02/2025    CO2 17 (L) 03/02/2025    GLUCOSE 82 03/02/2025    BUN 6 (L) 03/02/2025    CREATININE 0.65 03/02/2025        Lab Results   Component Value Date    WBC 4.6 (L) 03/02/2025    HEMOGLOBIN 8.4 (L) 03/02/2025    HEMATOCRIT 31.1 (L) 03/02/2025    PLATELETCT 130 (L) 03/02/2025        Total time of the discharge process exceeds 35 minutes.

## 2025-03-02 NOTE — PROGRESS NOTES
Bedside report received from off going RN/tech: KISHAN Regalado, assumed care of patient.     Fall Risk Score: MODERATE RISK  Fall risk interventions in place: Place yellow fall risk ID band on patient, Provide patient/family education based on risk assessment, Educate patient/family to call staff for assistance when getting out of bed, Place fall precaution signage outside patient door, Utilize bed/chair fall alarm, Notify charge of high risk for huddle, and Bed alarm connected correctly  Bed type: Regular (John Score less than 17 interventions in place)  Patient on cardiac monitor: Yes  IVF/IV medications: Infusion per MAR (List Med(s)) NS @ 83 mL/hr  Oxygen: Room Air  Bedside sitter: Not Applicable   Isolation: Isolation precautions in place

## 2025-03-02 NOTE — PROGRESS NOTES
Monitor Summary  Rhythm: SR/SB  Rate: 57-81  Ectopy: no ectopy  .15 / .08 / .35    Strip not uploaded to EPIC

## 2025-03-02 NOTE — PROGRESS NOTES
Bedside report received from off going RN/tech: Perico, RN, assumed care of patient.     Fall Risk Score: MODERATE RISK  Fall risk interventions in place: Provide patient/family education based on risk assessment, Educate patient/family to call staff for assistance when getting out of bed, Place fall precaution signage outside patient door, and Utilize bed/chair fall alarm  Bed type: Regular (John Score less than 17 interventions in place)  Patient on cardiac monitor: Yes  IVF/IV medications: Infusion per MAR (List Med(s)) NS at 83mL/Hr  Oxygen: Room Air  Bedside sitter: Not Applicable   Isolation: Isolation precautions in place Droplet

## 2025-03-02 NOTE — PROGRESS NOTES
Monitor Summary    SR  71-93    0.15/0.07/0.39    No ectopy.    Monitor strips not loading. Plan to have paper copy scanned to chart by .

## 2025-03-02 NOTE — CARE PLAN
The patient is Stable - Low risk of patient condition declining or worsening    Shift Goals  Clinical Goals: VSS, Safety  Patient Goals: Go Home  Family Goals: ISAIAS    Progress made toward(s) clinical / shift goals:    Problem: Hemodynamics  Goal: Patient's hemodynamics, fluid balance and neurologic status will be stable or improve  Outcome: Progressing  Note: Patient's neuro status remains the same, patient's SBP remains 90-100s.      Problem: Respiratory  Goal: Patient will achieve/maintain optimum respiratory ventilation and gas exchange  Outcome: Progressing  Note: Patient remains on room air.      Problem: Pain - Standard  Goal: Alleviation of pain or a reduction in pain to the patient’s comfort goal  Outcome: Progressing  Note: Patient has minimal complaints about pain.        Patient is not progressing towards the following goals:

## 2025-03-06 LAB
BACTERIA BLD CULT: NORMAL
BACTERIA BLD CULT: NORMAL
SIGNIFICANT IND 70042: NORMAL
SIGNIFICANT IND 70042: NORMAL
SITE SITE: NORMAL
SITE SITE: NORMAL
SOURCE SOURCE: NORMAL
SOURCE SOURCE: NORMAL

## 2025-03-17 ENCOUNTER — OFFICE VISIT (OUTPATIENT)
Dept: INTERNAL MEDICINE | Facility: OTHER | Age: 45
End: 2025-03-17
Payer: MEDICAID

## 2025-03-17 VITALS
HEIGHT: 62 IN | HEART RATE: 83 BPM | SYSTOLIC BLOOD PRESSURE: 98 MMHG | OXYGEN SATURATION: 97 % | DIASTOLIC BLOOD PRESSURE: 63 MMHG | TEMPERATURE: 97.8 F | WEIGHT: 151.4 LBS | BODY MASS INDEX: 27.86 KG/M2

## 2025-03-17 DIAGNOSIS — N92.0 MENORRHAGIA WITH REGULAR CYCLE: ICD-10-CM

## 2025-03-17 DIAGNOSIS — Z13.0 SCREENING FOR ENDOCRINE, NUTRITIONAL, METABOLIC AND IMMUNITY DISORDER: ICD-10-CM

## 2025-03-17 DIAGNOSIS — Z12.31 ENCOUNTER FOR SCREENING MAMMOGRAM FOR MALIGNANT NEOPLASM OF BREAST: ICD-10-CM

## 2025-03-17 DIAGNOSIS — D50.0 IRON DEFICIENCY ANEMIA DUE TO CHRONIC BLOOD LOSS: ICD-10-CM

## 2025-03-17 DIAGNOSIS — Z13.21 SCREENING FOR ENDOCRINE, NUTRITIONAL, METABOLIC AND IMMUNITY DISORDER: ICD-10-CM

## 2025-03-17 DIAGNOSIS — E66.3 OVERWEIGHT: ICD-10-CM

## 2025-03-17 DIAGNOSIS — Z13.228 SCREENING FOR ENDOCRINE, NUTRITIONAL, METABOLIC AND IMMUNITY DISORDER: ICD-10-CM

## 2025-03-17 DIAGNOSIS — Z13.29 SCREENING FOR ENDOCRINE, NUTRITIONAL, METABOLIC AND IMMUNITY DISORDER: ICD-10-CM

## 2025-03-17 PROCEDURE — 99204 OFFICE O/P NEW MOD 45 MIN: CPT | Mod: GC

## 2025-03-17 PROCEDURE — 3074F SYST BP LT 130 MM HG: CPT | Mod: GC

## 2025-03-17 PROCEDURE — 3078F DIAST BP <80 MM HG: CPT | Mod: GC

## 2025-03-17 RX ORDER — FERROUS SULFATE 325(65) MG
325 TABLET ORAL
Qty: 45 TABLET | Refills: 0 | Status: SHIPPED | OUTPATIENT
Start: 2025-03-17

## 2025-03-17 ASSESSMENT — FIBROSIS 4 INDEX: FIB4 SCORE: 3.39

## 2025-03-17 NOTE — PROGRESS NOTES
Reason for visit:    -Establish care    History of Present Illness:     Katlyn Hillman is a 45-year-old female patient with a history of iron deficiency anemia who was recently hospitalized for influenza pneumonia.      The patient mentioned having heavy menstrual cycles, which are regular every month, lasting 4-5 days, with the first two days being particularly heavy. She has never had a Pap smear or a mammogram in the past.      During this visit, the patient feels back to her normal baseline and reports no shortness of breath, chest pain, palpitations, headaches, dizziness, lightheadedness, or episodes of syncope. She has been pregnant three times and has three children; the first was born via vaginal delivery, and the other two via  section. She is not currently breastfeeding.      The patient denies any history of blood clots. She was started on IV iron while in the hospital. She does not have any other medical concerns at this time and agrees with the plan described below.    ROS     Constitutional: No chills or fever.  HENT: No ear pain, hearing loss, or tinnitus.  Eyes: No blurred vision or double vision.  Respiratory: No cough, hemoptysis, sputum production, or shortness of breath.  Cardiovascular: Negative for chest pain, palpitations, or leg swelling.  Gastrointestinal: No nausea, blood in stool, constipation, diarrhea, melena, or vomiting.  Genitourinary: No dysuria and urgency. Heavy menstrual bleeding.  Skin: No itching.  Psychiatric/Behavioral: No depression or suicidal ideas.    Past Medical History:   Past Medical History:   Diagnosis Date    Anemia 2025       Patient Active Problem List    Diagnosis Date Noted    Acute on chronic blood loss anemia 2025    Influenza A 2025    Hypokalemia 2025    Normal anion gap metabolic acidosis 2025    Hyponatremia 2025    Previous  section 2014    Language barrier 2013    History of C/S x 1 -  "needs repeat, BTL desired 11/13/2013       Past Surgical History:   Procedure Laterality Date    REPEAT C SECTION W TUBAL LIGATION  4/3/2014    Performed by Soledad Chicas M.D. at [facility]    PRIMARY C SECTION          Allergies:  Patient has no known allergies.    Medications:     Current Outpatient Medications:     ferrous sulfate, 325 mg, Oral, Q48HRS, Taking    ACETAMINOPHEN PO, 2 Tablet, Oral, Q6HRS PRN, PRN     Social History:   Social History     Tobacco Use    Smoking status: Never    Smokeless tobacco: Never   Vaping Use    Vaping status: Never Used   Substance Use Topics    Alcohol use: No    Drug use: No       Family History:   Family History   Problem Relation Age of Onset    Diabetes Paternal Grandfather        Vitals:   BP 98/63 (BP Location: Left arm, Patient Position: Sitting, BP Cuff Size: Adult)   Pulse 83   Temp 36.6 °C (97.8 °F) (Temporal)   Ht 1.575 m (5' 2\")   Wt 68.7 kg (151 lb 6.4 oz)   SpO2 97%  Body mass index is 27.69 kg/m².    Physical Exam     Constitutional:       General:   The patient is in no acute distress.     Appearance: Normal appearance. She is not ill-appearing, toxic-appearing or diaphoretic.   HENT:      Head: Normocephalic and atraumatic.      Mouth/Throat:      Mouth: Mucous membranes are moist.      Pharynx: Oropharynx is clear. No oropharyngeal exudate or posterior oropharyngeal erythema.   Eyes:      General: No scleral icterus.        Right eye: No discharge.         Left eye: No discharge.      Conjunctiva/sclera: Normal conjunctivae.   Cardiovascular:      Rate and Rhythm: Normal rate and regular rhythm.      Pulses: Normal pulses.      Heart sounds: Normal heart sounds. No murmur heard.     No friction rub. No gallop.   Pulmonary:      Effort: Pulmonary effort is normal. No respiratory distress.      Breath sounds: Normal breath sounds. No stridor. No wheezing or rhonchi.   Abdominal:      General: Abdomen is flat. Bowel sounds are normal. There is " no distension.      Palpations: Abdomen is soft. There are no masses.      Tenderness: There is no abdominal tenderness.      Hernia: No hernia is present.   Musculoskeletal:         General: No swelling or tenderness.      Right lower leg: No edema.      Left lower leg: No edema.    Skin:     General: Skin is warm and moist.      Coloration: Skin is not pale.      Findings: No erythema or rash.   Neurological:      General: No focal deficit present.      Mental Status: She is alert and oriented to person, place, and time. Mental status is at baseline    Labs:     Lab Results   Component Value Date/Time    WBC 4.6 (L) 03/02/2025 04:14 AM    RBC 4.70 03/02/2025 04:14 AM    HEMOGLOBIN 8.4 (L) 03/02/2025 04:14 AM    HEMATOCRIT 31.1 (L) 03/02/2025 04:14 AM    MCV 66.2 (L) 03/02/2025 04:14 AM    MCH 17.9 (L) 03/02/2025 04:14 AM    MCHC 27.0 (L) 03/02/2025 04:14 AM    MPV 10.0 03/01/2025 01:05 AM    NEUTSPOLYS 54.70 03/01/2025 01:05 AM    LYMPHOCYTES 31.60 03/01/2025 01:05 AM    MONOCYTES 12.00 03/01/2025 01:05 AM    EOSINOPHILS 0.70 03/01/2025 01:05 AM    BASOPHILS 0.30 03/01/2025 01:05 AM    HYPOCHROMIA 2+ (A) 02/28/2025 05:28 PM    ANISOCYTOSIS 1+ 02/28/2025 05:28 PM        Lab Results   Component Value Date/Time    SODIUM 139 03/02/2025 04:14 AM    POTASSIUM 3.9 03/02/2025 04:14 AM    CHLORIDE 113 (H) 03/02/2025 04:14 AM    CO2 17 (L) 03/02/2025 04:14 AM    GLUCOSE 82 03/02/2025 04:14 AM    BUN 6 (L) 03/02/2025 04:14 AM    CREATININE 0.65 03/02/2025 04:14 AM        Assessment and Plan    1. Iron deficiency anemia due to chronic blood loss  Most likely secondary to uterine abnormalities.  Heavy menstrual bleeding reported by the patient.  Follow-up in three months with repeat labs.  - ferrous sulfate 325 (65 Fe) MG tablet; Take 1 Tablet by mouth every 48 hours.  Dispense: 45 Tablet; Refill: 0  - CBC WITHOUT DIFFERENTIAL; Future  - Referral to OB/Gyn  - US-OB TRANSVAGINAL ONLY; Future    2. Menorrhagia with regular  cycle  Once we receive the results from the uterine ultrasound, we will prescribe birth control pills.    If the OB/GYN sees the patient first, follow their recommendations.  - Referral to OB/Gyn  - US-OB TRANSVAGINAL ONLY; Future    3. Overweight  Lifestyle changes were encouraged during our conversation.  - Lipid Profile; Future    4. Screening for endocrine, nutritional, metabolic and immunity disorder  - Lipid Profile; Future    5. Encounter for screening mammogram for malignant neoplasm of breast  - MA-SCREENING MAMMO BILAT W/CAD; Future      Orders Placed This Encounter    MA-SCREENING MAMMO BILAT W/CAD    US-OB TRANSVAGINAL ONLY    CBC WITHOUT DIFFERENTIAL    Lipid Profile    Referral to OB/Gyn    ferrous sulfate 325 (65 Fe) MG tablet       Return in about 3 months (around 6/17/2025).    I personally spent a total of 30 minutes talking to the patient and addressing all her concerns.    Please note that this dictation was created using voice recognition software. I have made every reasonable attempt to correct obvious errors, but I expect that there are errors of grammar and possibly content that I did not discover before finalizing the note.    Patient case was seen/ assessed/ discussed with Dr. Bautista    Signed by:    Laz Cruz M.D.    PGY-1 Internal Medicine Resident

## 2025-03-17 NOTE — PATIENT INSTRUCTIONS
-Our next appointment will be in three months.  -Please do your labs a week before our next appointment in three months.  -Please take your iron pills every other day as specified in the orders.  -I have sent a referral to OBGYN so they can take care of your heavy menstrual bleeding, please expect a call from them in the next week. If the do not call you in a couple of days, please give us a call so we can give you the information needed for you to schedule an appointment with them.  -Please call [facility] to schedule your vaginal ultrasound.

## 2025-06-11 ENCOUNTER — APPOINTMENT (OUTPATIENT)
Dept: INTERNAL MEDICINE | Facility: OTHER | Age: 45
End: 2025-06-11
Payer: MEDICAID